# Patient Record
Sex: FEMALE | Race: WHITE | NOT HISPANIC OR LATINO | URBAN - METROPOLITAN AREA
[De-identification: names, ages, dates, MRNs, and addresses within clinical notes are randomized per-mention and may not be internally consistent; named-entity substitution may affect disease eponyms.]

---

## 2017-01-01 ENCOUNTER — ANESTHESIA EVENT (OUTPATIENT)
Dept: INTENSIVE CARE | Facility: HOSPITAL | Age: 77
DRG: 283 | End: 2017-01-01
Payer: MEDICARE

## 2017-01-01 ENCOUNTER — HOSPITAL ENCOUNTER (INPATIENT)
Facility: HOSPITAL | Age: 77
LOS: 6 days | DRG: 283 | End: 2017-04-09
Attending: INTERNAL MEDICINE | Admitting: INTERNAL MEDICINE
Payer: MEDICARE

## 2017-01-01 ENCOUNTER — ANESTHESIA (OUTPATIENT)
Dept: INTENSIVE CARE | Facility: HOSPITAL | Age: 77
DRG: 283 | End: 2017-01-01
Payer: MEDICARE

## 2017-01-01 ENCOUNTER — DOCUMENTATION ONLY (OUTPATIENT)
Dept: CARDIOLOGY | Facility: CLINIC | Age: 77
End: 2017-01-01

## 2017-01-01 VITALS
WEIGHT: 121.25 LBS | TEMPERATURE: 98 F | RESPIRATION RATE: 26 BRPM | OXYGEN SATURATION: 100 % | HEART RATE: 72 BPM | BODY MASS INDEX: 22.31 KG/M2 | DIASTOLIC BLOOD PRESSURE: 65 MMHG | SYSTOLIC BLOOD PRESSURE: 138 MMHG | HEIGHT: 62 IN

## 2017-01-01 DIAGNOSIS — I47.20 VT (VENTRICULAR TACHYCARDIA): ICD-10-CM

## 2017-01-01 DIAGNOSIS — N17.0 ATN (ACUTE TUBULAR NECROSIS): ICD-10-CM

## 2017-01-01 DIAGNOSIS — I47.20 VENTRICULAR TACHYCARDIA: ICD-10-CM

## 2017-01-01 DIAGNOSIS — T50.8X5A CONTRAST DYE INDUCED NEPHROPATHY: ICD-10-CM

## 2017-01-01 DIAGNOSIS — N17.9 AKI (ACUTE KIDNEY INJURY): Primary | ICD-10-CM

## 2017-01-01 DIAGNOSIS — I44.7 LBBB (LEFT BUNDLE BRANCH BLOCK): ICD-10-CM

## 2017-01-01 DIAGNOSIS — E87.1 HYPONATREMIA: ICD-10-CM

## 2017-01-01 DIAGNOSIS — I24.0 LV (LEFT VENTRICULAR) MURAL THROMBUS WITHOUT MI: ICD-10-CM

## 2017-01-01 DIAGNOSIS — N14.11 CONTRAST DYE INDUCED NEPHROPATHY: ICD-10-CM

## 2017-01-01 DIAGNOSIS — I50.22 CHRONIC SYSTOLIC HEART FAILURE: ICD-10-CM

## 2017-01-01 DIAGNOSIS — Z95.810 S/P ICD (INTERNAL CARDIAC DEFIBRILLATOR) PROCEDURE: ICD-10-CM

## 2017-01-01 DIAGNOSIS — N18.30 BENIGN HYPERTENSIVE HEART AND CKD, STAGE 3 (GFR 30-59), W CHF: ICD-10-CM

## 2017-01-01 DIAGNOSIS — R00.0 WIDE-COMPLEX TACHYCARDIA: ICD-10-CM

## 2017-01-01 DIAGNOSIS — Z95.1 S/P CABG (CORONARY ARTERY BYPASS GRAFT): ICD-10-CM

## 2017-01-01 DIAGNOSIS — I25.5 ISCHEMIC CARDIOMYOPATHY: ICD-10-CM

## 2017-01-01 DIAGNOSIS — Z95.810 CARDIAC RESYNCHRONIZATION THERAPY DEFIBRILLATOR (CRT-D) IN PLACE: ICD-10-CM

## 2017-01-01 DIAGNOSIS — I13.0 BENIGN HYPERTENSIVE HEART AND CKD, STAGE 3 (GFR 30-59), W CHF: ICD-10-CM

## 2017-01-01 LAB
ALBUMIN SERPL BCP-MCNC: 2.5 G/DL
ALBUMIN SERPL BCP-MCNC: 2.7 G/DL
ALBUMIN SERPL BCP-MCNC: 2.8 G/DL
ALP SERPL-CCNC: 101 U/L
ALP SERPL-CCNC: 106 U/L
ALP SERPL-CCNC: 82 U/L
ALP SERPL-CCNC: 82 U/L
ALP SERPL-CCNC: 95 U/L
ALP SERPL-CCNC: 98 U/L
ALT SERPL W/O P-5'-P-CCNC: 265 U/L
ALT SERPL W/O P-5'-P-CCNC: 292 U/L
ALT SERPL W/O P-5'-P-CCNC: 339 U/L
ALT SERPL W/O P-5'-P-CCNC: 359 U/L
ALT SERPL W/O P-5'-P-CCNC: 400 U/L
ALT SERPL W/O P-5'-P-CCNC: 419 U/L
ANION GAP SERPL CALC-SCNC: 14 MMOL/L
ANION GAP SERPL CALC-SCNC: 15 MMOL/L
ANION GAP SERPL CALC-SCNC: 15 MMOL/L
ANION GAP SERPL CALC-SCNC: 17 MMOL/L
ANION GAP SERPL CALC-SCNC: 17 MMOL/L
ANION GAP SERPL CALC-SCNC: 18 MMOL/L
ANION GAP SERPL CALC-SCNC: 18 MMOL/L
ANION GAP SERPL CALC-SCNC: 19 MMOL/L
ANION GAP SERPL CALC-SCNC: 20 MMOL/L
ANISOCYTOSIS BLD QL SMEAR: ABNORMAL
ANISOCYTOSIS BLD QL SMEAR: SLIGHT
AORTIC VALVE REGURGITATION: ABNORMAL
APTT BLDCRRT: 25 SEC
APTT BLDCRRT: 26.6 SEC
APTT BLDCRRT: >150 SEC
AST SERPL-CCNC: 129 U/L
AST SERPL-CCNC: 149 U/L
AST SERPL-CCNC: 161 U/L
AST SERPL-CCNC: 350 U/L
AST SERPL-CCNC: 77 U/L
AST SERPL-CCNC: 85 U/L
BACTERIA #/AREA URNS AUTO: NORMAL /HPF
BACTERIA #/AREA URNS AUTO: NORMAL /HPF
BACTERIA BLD CULT: NORMAL
BACTERIA BLD CULT: NORMAL
BACTERIA UR CULT: NO GROWTH
BACTERIA UR CULT: NORMAL
BASOPHILS # BLD AUTO: 0.01 K/UL
BASOPHILS # BLD AUTO: 0.01 K/UL
BASOPHILS # BLD AUTO: 0.02 K/UL
BASOPHILS # BLD AUTO: 0.02 K/UL
BASOPHILS # BLD AUTO: ABNORMAL K/UL
BASOPHILS NFR BLD: 0 %
BASOPHILS NFR BLD: 0 %
BASOPHILS NFR BLD: 0.1 %
BASOPHILS NFR BLD: 1 %
BILIRUB SERPL-MCNC: 0.5 MG/DL
BILIRUB SERPL-MCNC: 0.5 MG/DL
BILIRUB SERPL-MCNC: 0.6 MG/DL
BILIRUB UR QL STRIP: NEGATIVE
BILIRUB UR QL STRIP: NEGATIVE
BNP SERPL-MCNC: 2705 PG/ML
BUN SERPL-MCNC: 105 MG/DL
BUN SERPL-MCNC: 112 MG/DL
BUN SERPL-MCNC: 113 MG/DL
BUN SERPL-MCNC: 115 MG/DL
BUN SERPL-MCNC: 119 MG/DL
BUN SERPL-MCNC: 72 MG/DL
BUN SERPL-MCNC: 89 MG/DL
BURR CELLS BLD QL SMEAR: ABNORMAL
C DIFF GDH STL QL: NEGATIVE
C DIFF TOX A+B STL QL IA: NEGATIVE
CALCIUM SERPL-MCNC: 7.5 MG/DL
CALCIUM SERPL-MCNC: 7.8 MG/DL
CALCIUM SERPL-MCNC: 7.9 MG/DL
CALCIUM SERPL-MCNC: 8.1 MG/DL
CALCIUM SERPL-MCNC: 8.4 MG/DL
CALCIUM SERPL-MCNC: 8.5 MG/DL
CALCIUM SERPL-MCNC: 8.6 MG/DL
CALCIUM SERPL-MCNC: 8.7 MG/DL
CALCIUM SERPL-MCNC: 8.9 MG/DL
CHLORIDE SERPL-SCNC: 90 MMOL/L
CHLORIDE SERPL-SCNC: 90 MMOL/L
CHLORIDE SERPL-SCNC: 91 MMOL/L
CHLORIDE SERPL-SCNC: 93 MMOL/L
CHLORIDE SERPL-SCNC: 95 MMOL/L
CHLORIDE SERPL-SCNC: 96 MMOL/L
CHLORIDE SERPL-SCNC: 96 MMOL/L
CHLORIDE SERPL-SCNC: 97 MMOL/L
CHLORIDE SERPL-SCNC: 98 MMOL/L
CHOLEST/HDLC SERPL: 2.4 {RATIO}
CLARITY UR REFRACT.AUTO: ABNORMAL
CLARITY UR REFRACT.AUTO: CLEAR
CO2 SERPL-SCNC: 13 MMOL/L
CO2 SERPL-SCNC: 15 MMOL/L
CO2 SERPL-SCNC: 15 MMOL/L
CO2 SERPL-SCNC: 16 MMOL/L
CO2 SERPL-SCNC: 16 MMOL/L
CO2 SERPL-SCNC: 17 MMOL/L
CO2 SERPL-SCNC: 21 MMOL/L
CO2 SERPL-SCNC: 23 MMOL/L
CO2 SERPL-SCNC: 23 MMOL/L
COLOR UR AUTO: YELLOW
COLOR UR AUTO: YELLOW
CREAT SERPL-MCNC: 2.8 MG/DL
CREAT SERPL-MCNC: 3 MG/DL
CREAT SERPL-MCNC: 3.1 MG/DL
CREAT SERPL-MCNC: 3.2 MG/DL
CREAT SERPL-MCNC: 3.5 MG/DL
CREAT SERPL-MCNC: 3.7 MG/DL
CREAT SERPL-MCNC: 4 MG/DL
CREAT SERPL-MCNC: 4 MG/DL
CREAT SERPL-MCNC: 4.1 MG/DL
CREAT UR-MCNC: 47 MG/DL
DACRYOCYTES BLD QL SMEAR: ABNORMAL
DIASTOLIC DYSFUNCTION: YES
DIFFERENTIAL METHOD: ABNORMAL
EOSINOPHIL # BLD AUTO: 0 K/UL
EOSINOPHIL # BLD AUTO: ABNORMAL K/UL
EOSINOPHIL NFR BLD: 0 %
EOSINOPHIL NFR BLD: 2 %
ERYTHROCYTE [DISTWIDTH] IN BLOOD BY AUTOMATED COUNT: 15.2 %
ERYTHROCYTE [DISTWIDTH] IN BLOOD BY AUTOMATED COUNT: 15.8 %
ERYTHROCYTE [DISTWIDTH] IN BLOOD BY AUTOMATED COUNT: 15.9 %
ERYTHROCYTE [DISTWIDTH] IN BLOOD BY AUTOMATED COUNT: 16.4 %
ERYTHROCYTE [DISTWIDTH] IN BLOOD BY AUTOMATED COUNT: 17 %
ERYTHROCYTE [DISTWIDTH] IN BLOOD BY AUTOMATED COUNT: 17.4 %
EST. GFR  (AFRICAN AMERICAN): 11.5 ML/MIN/1.73 M^2
EST. GFR  (AFRICAN AMERICAN): 11.8 ML/MIN/1.73 M^2
EST. GFR  (AFRICAN AMERICAN): 11.8 ML/MIN/1.73 M^2
EST. GFR  (AFRICAN AMERICAN): 13 ML/MIN/1.73 M^2
EST. GFR  (AFRICAN AMERICAN): 13.9 ML/MIN/1.73 M^2
EST. GFR  (AFRICAN AMERICAN): 15.5 ML/MIN/1.73 M^2
EST. GFR  (AFRICAN AMERICAN): 16.1 ML/MIN/1.73 M^2
EST. GFR  (AFRICAN AMERICAN): 16.8 ML/MIN/1.73 M^2
EST. GFR  (AFRICAN AMERICAN): 18.2 ML/MIN/1.73 M^2
EST. GFR  (NON AFRICAN AMERICAN): 10 ML/MIN/1.73 M^2
EST. GFR  (NON AFRICAN AMERICAN): 10.3 ML/MIN/1.73 M^2
EST. GFR  (NON AFRICAN AMERICAN): 10.3 ML/MIN/1.73 M^2
EST. GFR  (NON AFRICAN AMERICAN): 11.3 ML/MIN/1.73 M^2
EST. GFR  (NON AFRICAN AMERICAN): 12.1 ML/MIN/1.73 M^2
EST. GFR  (NON AFRICAN AMERICAN): 13.4 ML/MIN/1.73 M^2
EST. GFR  (NON AFRICAN AMERICAN): 14 ML/MIN/1.73 M^2
EST. GFR  (NON AFRICAN AMERICAN): 14.5 ML/MIN/1.73 M^2
EST. GFR  (NON AFRICAN AMERICAN): 15.8 ML/MIN/1.73 M^2
ESTIMATED PA SYSTOLIC PRESSURE: 40
FACT X PPP CHRO-ACNC: 1.28 IU/ML
FACT X PPP CHRO-ACNC: <0.1 IU/ML
GLUCOSE SERPL-MCNC: 103 MG/DL
GLUCOSE SERPL-MCNC: 103 MG/DL
GLUCOSE SERPL-MCNC: 111 MG/DL
GLUCOSE SERPL-MCNC: 111 MG/DL
GLUCOSE SERPL-MCNC: 114 MG/DL
GLUCOSE SERPL-MCNC: 117 MG/DL
GLUCOSE SERPL-MCNC: 126 MG/DL
GLUCOSE SERPL-MCNC: 132 MG/DL
GLUCOSE SERPL-MCNC: 180 MG/DL
GLUCOSE UR QL STRIP: NEGATIVE
GLUCOSE UR QL STRIP: NEGATIVE
HAV IGM SERPL QL IA: NEGATIVE
HBV CORE IGM SERPL QL IA: NEGATIVE
HBV SURFACE AG SERPL QL IA: NEGATIVE
HCT VFR BLD AUTO: 31.8 %
HCT VFR BLD AUTO: 32.3 %
HCT VFR BLD AUTO: 33.7 %
HCT VFR BLD AUTO: 35.9 %
HCT VFR BLD AUTO: 36.9 %
HCT VFR BLD AUTO: 37.5 %
HCV AB SERPL QL IA: NEGATIVE
HDL/CHOLESTEROL RATIO: 42.2 %
HDLC SERPL-MCNC: 35 MG/DL
HDLC SERPL-MCNC: 83 MG/DL
HGB BLD-MCNC: 11 G/DL
HGB BLD-MCNC: 11.3 G/DL
HGB BLD-MCNC: 11.5 G/DL
HGB BLD-MCNC: 12.6 G/DL
HGB BLD-MCNC: 13 G/DL
HGB BLD-MCNC: 13.1 G/DL
HGB UR QL STRIP: ABNORMAL
HGB UR QL STRIP: NEGATIVE
HYALINE CASTS UR QL AUTO: 1 /LPF
HYALINE CASTS UR QL AUTO: 1 /LPF
HYPOCHROMIA BLD QL SMEAR: ABNORMAL
HYPOCHROMIA BLD QL SMEAR: ABNORMAL
INR PPP: 2
INR PPP: 2.1
INR PPP: 4.3
INR PPP: 4.6
INR PPP: 4.6
INR PPP: 5.5
KETONES UR QL STRIP: NEGATIVE
KETONES UR QL STRIP: NEGATIVE
LACTATE SERPL-SCNC: 1.6 MMOL/L
LDLC SERPL CALC-MCNC: 40.2 MG/DL
LEUKOCYTE ESTERASE UR QL STRIP: ABNORMAL
LEUKOCYTE ESTERASE UR QL STRIP: NEGATIVE
LIDOCAIN SERPL-MCNC: 8 MCG/ML
LYMPHOCYTES # BLD AUTO: 1.1 K/UL
LYMPHOCYTES # BLD AUTO: 1.2 K/UL
LYMPHOCYTES # BLD AUTO: 1.3 K/UL
LYMPHOCYTES # BLD AUTO: 1.6 K/UL
LYMPHOCYTES # BLD AUTO: ABNORMAL K/UL
LYMPHOCYTES NFR BLD: 4.2 %
LYMPHOCYTES NFR BLD: 4.9 %
LYMPHOCYTES NFR BLD: 6 %
LYMPHOCYTES NFR BLD: 6.2 %
LYMPHOCYTES NFR BLD: 7 %
LYMPHOCYTES NFR BLD: 8.7 %
MAGNESIUM SERPL-MCNC: 1.9 MG/DL
MAGNESIUM SERPL-MCNC: 1.9 MG/DL
MAGNESIUM SERPL-MCNC: 2 MG/DL
MAGNESIUM SERPL-MCNC: 2.1 MG/DL
MCH RBC QN AUTO: 28.5 PG
MCH RBC QN AUTO: 28.6 PG
MCH RBC QN AUTO: 29.2 PG
MCH RBC QN AUTO: 29.4 PG
MCH RBC QN AUTO: 29.4 PG
MCH RBC QN AUTO: 29.5 PG
MCHC RBC AUTO-ENTMCNC: 33.5 %
MCHC RBC AUTO-ENTMCNC: 33.6 %
MCHC RBC AUTO-ENTMCNC: 34.6 %
MCHC RBC AUTO-ENTMCNC: 35.2 %
MCHC RBC AUTO-ENTMCNC: 35.6 %
MCHC RBC AUTO-ENTMCNC: 36.5 %
MCV RBC AUTO: 81 FL
MCV RBC AUTO: 82 FL
MCV RBC AUTO: 84 FL
MCV RBC AUTO: 85 FL
MICROSCOPIC COMMENT: NORMAL
MICROSCOPIC COMMENT: NORMAL
MITRAL VALVE REGURGITATION: ABNORMAL
MONOCYTES # BLD AUTO: 0.9 K/UL
MONOCYTES # BLD AUTO: 1.2 K/UL
MONOCYTES # BLD AUTO: 1.3 K/UL
MONOCYTES # BLD AUTO: 1.6 K/UL
MONOCYTES # BLD AUTO: ABNORMAL K/UL
MONOCYTES NFR BLD: 5 %
MONOCYTES NFR BLD: 6 %
MONOCYTES NFR BLD: 6.6 %
MONOCYTES NFR BLD: 8 %
NEUTROPHILS # BLD AUTO: 15.6 K/UL
NEUTROPHILS # BLD AUTO: 18.3 K/UL
NEUTROPHILS # BLD AUTO: 21.4 K/UL
NEUTROPHILS # BLD AUTO: 22 K/UL
NEUTROPHILS NFR BLD: 82 %
NEUTROPHILS NFR BLD: 85 %
NEUTROPHILS NFR BLD: 85.5 %
NEUTROPHILS NFR BLD: 87.8 %
NEUTROPHILS NFR BLD: 89.1 %
NEUTROPHILS NFR BLD: 90 %
NEUTS BAND NFR BLD MANUAL: 4 %
NITRITE UR QL STRIP: NEGATIVE
NITRITE UR QL STRIP: NEGATIVE
NONHDLC SERPL-MCNC: 48 MG/DL
OSMOLALITY SERPL: 285 MOSM/KG
OSMOLALITY UR: 313 MOSM/KG
OVALOCYTES BLD QL SMEAR: ABNORMAL
OVALOCYTES BLD QL SMEAR: ABNORMAL
PH UR STRIP: 5 [PH] (ref 5–8)
PH UR STRIP: 5 [PH] (ref 5–8)
PHOSPHATE SERPL-MCNC: 4.2 MG/DL
PHOSPHATE SERPL-MCNC: 4.8 MG/DL
PHOSPHATE SERPL-MCNC: 5.5 MG/DL
PHOSPHATE SERPL-MCNC: 6.6 MG/DL
PHOSPHATE SERPL-MCNC: 8.1 MG/DL
PHOSPHATE SERPL-MCNC: 8.4 MG/DL
PLATELET # BLD AUTO: 214 K/UL
PLATELET # BLD AUTO: 222 K/UL
PLATELET # BLD AUTO: 226 K/UL
PLATELET # BLD AUTO: 229 K/UL
PLATELET # BLD AUTO: 238 K/UL
PLATELET # BLD AUTO: 256 K/UL
PLATELET BLD QL SMEAR: ABNORMAL
PMV BLD AUTO: 10 FL
PMV BLD AUTO: 10.1 FL
PMV BLD AUTO: 10.3 FL
PMV BLD AUTO: 10.4 FL
PMV BLD AUTO: 10.5 FL
PMV BLD AUTO: 10.9 FL
POCT GLUCOSE: 128 MG/DL (ref 70–110)
POCT GLUCOSE: 150 MG/DL (ref 70–110)
POIKILOCYTOSIS BLD QL SMEAR: ABNORMAL
POIKILOCYTOSIS BLD QL SMEAR: SLIGHT
POIKILOCYTOSIS BLD QL SMEAR: SLIGHT
POLYCHROMASIA BLD QL SMEAR: ABNORMAL
POTASSIUM SERPL-SCNC: 3.7 MMOL/L
POTASSIUM SERPL-SCNC: 3.7 MMOL/L
POTASSIUM SERPL-SCNC: 3.8 MMOL/L
POTASSIUM SERPL-SCNC: 3.9 MMOL/L
POTASSIUM SERPL-SCNC: 4 MMOL/L
POTASSIUM SERPL-SCNC: 4 MMOL/L
POTASSIUM SERPL-SCNC: 4.3 MMOL/L
POTASSIUM SERPL-SCNC: 4.4 MMOL/L
POTASSIUM SERPL-SCNC: 5.1 MMOL/L
PROT SERPL-MCNC: 5.9 G/DL
PROT SERPL-MCNC: 6 G/DL
PROT SERPL-MCNC: 6 G/DL
PROT SERPL-MCNC: 6.2 G/DL
PROT SERPL-MCNC: 6.2 G/DL
PROT SERPL-MCNC: 6.5 G/DL
PROT UR QL STRIP: NEGATIVE
PROT UR QL STRIP: NEGATIVE
PROTHROMBIN TIME: 20.2 SEC
PROTHROMBIN TIME: 20.8 SEC
PROTHROMBIN TIME: 43.3 SEC
PROTHROMBIN TIME: 46.5 SEC
PROTHROMBIN TIME: 47.2 SEC
PROTHROMBIN TIME: 56.4 SEC
RBC # BLD AUTO: 3.74 M/UL
RBC # BLD AUTO: 3.94 M/UL
RBC # BLD AUTO: 3.96 M/UL
RBC # BLD AUTO: 4.4 M/UL
RBC # BLD AUTO: 4.41 M/UL
RBC # BLD AUTO: 4.45 M/UL
RBC #/AREA URNS AUTO: 1 /HPF (ref 0–4)
RBC #/AREA URNS AUTO: 1 /HPF (ref 0–4)
RETIRED EF AND QEF - SEE NOTES: 20 (ref 55–65)
SODIUM SERPL-SCNC: 122 MMOL/L
SODIUM SERPL-SCNC: 123 MMOL/L
SODIUM SERPL-SCNC: 125 MMOL/L
SODIUM SERPL-SCNC: 125 MMOL/L
SODIUM SERPL-SCNC: 130 MMOL/L
SODIUM SERPL-SCNC: 132 MMOL/L
SODIUM SERPL-SCNC: 132 MMOL/L
SODIUM SERPL-SCNC: 134 MMOL/L
SODIUM SERPL-SCNC: 135 MMOL/L
SODIUM UR-SCNC: 32 MMOL/L
SP GR UR STRIP: 1.01 (ref 1–1.03)
SP GR UR STRIP: 1.01 (ref 1–1.03)
SQUAMOUS #/AREA URNS AUTO: 0 /HPF
SQUAMOUS #/AREA URNS AUTO: 1 /HPF
T4 SERPL-MCNC: 6.4 UG/DL
TARGETS BLD QL SMEAR: ABNORMAL
TRICUSPID VALVE REGURGITATION: ABNORMAL
TRIGL SERPL-MCNC: 39 MG/DL
TSH SERPL DL<=0.005 MIU/L-ACNC: 0.96 UIU/ML
URN SPEC COLLECT METH UR: ABNORMAL
URN SPEC COLLECT METH UR: ABNORMAL
UROBILINOGEN UR STRIP-ACNC: NEGATIVE EU/DL
UROBILINOGEN UR STRIP-ACNC: NEGATIVE EU/DL
UUN UR-MCNC: 338 MG/DL
WBC # BLD AUTO: 18.27 K/UL
WBC # BLD AUTO: 20.96 K/UL
WBC # BLD AUTO: 21.06 K/UL
WBC # BLD AUTO: 21.47 K/UL
WBC # BLD AUTO: 24.05 K/UL
WBC # BLD AUTO: 24.8 K/UL
WBC #/AREA URNS AUTO: 2 /HPF (ref 0–5)
WBC #/AREA URNS AUTO: 2 /HPF (ref 0–5)

## 2017-01-01 PROCEDURE — 25000003 PHARM REV CODE 250: Performed by: STUDENT IN AN ORGANIZED HEALTH CARE EDUCATION/TRAINING PROGRAM

## 2017-01-01 PROCEDURE — 63600175 PHARM REV CODE 636 W HCPCS: Performed by: INTERNAL MEDICINE

## 2017-01-01 PROCEDURE — 93005 ELECTROCARDIOGRAM TRACING: CPT

## 2017-01-01 PROCEDURE — 83735 ASSAY OF MAGNESIUM: CPT

## 2017-01-01 PROCEDURE — 25000003 PHARM REV CODE 250

## 2017-01-01 PROCEDURE — 84132 ASSAY OF SERUM POTASSIUM: CPT

## 2017-01-01 PROCEDURE — 20000000 HC ICU ROOM

## 2017-01-01 PROCEDURE — 25000003 PHARM REV CODE 250: Performed by: INTERNAL MEDICINE

## 2017-01-01 PROCEDURE — 81001 URINALYSIS AUTO W/SCOPE: CPT

## 2017-01-01 PROCEDURE — 85025 COMPLETE CBC W/AUTO DIFF WBC: CPT

## 2017-01-01 PROCEDURE — 36415 COLL VENOUS BLD VENIPUNCTURE: CPT

## 2017-01-01 PROCEDURE — 85610 PROTHROMBIN TIME: CPT

## 2017-01-01 PROCEDURE — 27200966 HC CLOSED SUCTION SYSTEM

## 2017-01-01 PROCEDURE — 99223 1ST HOSP IP/OBS HIGH 75: CPT | Mod: ,,, | Performed by: INTERNAL MEDICINE

## 2017-01-01 PROCEDURE — 99233 SBSQ HOSP IP/OBS HIGH 50: CPT | Mod: ,,, | Performed by: INTERNAL MEDICINE

## 2017-01-01 PROCEDURE — 80053 COMPREHEN METABOLIC PANEL: CPT

## 2017-01-01 PROCEDURE — 82330 ASSAY OF CALCIUM: CPT

## 2017-01-01 PROCEDURE — 85014 HEMATOCRIT: CPT

## 2017-01-01 PROCEDURE — 80048 BASIC METABOLIC PNL TOTAL CA: CPT

## 2017-01-01 PROCEDURE — 85027 COMPLETE CBC AUTOMATED: CPT

## 2017-01-01 PROCEDURE — 87449 NOS EACH ORGANISM AG IA: CPT

## 2017-01-01 PROCEDURE — 87086 URINE CULTURE/COLONY COUNT: CPT

## 2017-01-01 PROCEDURE — 97161 PT EVAL LOW COMPLEX 20 MIN: CPT

## 2017-01-01 PROCEDURE — 84300 ASSAY OF URINE SODIUM: CPT

## 2017-01-01 PROCEDURE — 27000221 HC OXYGEN, UP TO 24 HOURS

## 2017-01-01 PROCEDURE — 97530 THERAPEUTIC ACTIVITIES: CPT

## 2017-01-01 PROCEDURE — 84100 ASSAY OF PHOSPHORUS: CPT

## 2017-01-01 PROCEDURE — 0BH17EZ INSERTION OF ENDOTRACHEAL AIRWAY INTO TRACHEA, VIA NATURAL OR ARTIFICIAL OPENING: ICD-10-PCS | Performed by: INTERNAL MEDICINE

## 2017-01-01 PROCEDURE — 93306 TTE W/DOPPLER COMPLETE: CPT | Mod: 26,,, | Performed by: INTERNAL MEDICINE

## 2017-01-01 PROCEDURE — 5A1935Z RESPIRATORY VENTILATION, LESS THAN 24 CONSECUTIVE HOURS: ICD-10-PCS | Performed by: INTERNAL MEDICINE

## 2017-01-01 PROCEDURE — 82803 BLOOD GASES ANY COMBINATION: CPT

## 2017-01-01 PROCEDURE — 93041 RHYTHM ECG TRACING: CPT

## 2017-01-01 PROCEDURE — 63600175 PHARM REV CODE 636 W HCPCS

## 2017-01-01 PROCEDURE — 84436 ASSAY OF TOTAL THYROXINE: CPT

## 2017-01-01 PROCEDURE — 94002 VENT MGMT INPAT INIT DAY: CPT

## 2017-01-01 PROCEDURE — 93010 ELECTROCARDIOGRAM REPORT: CPT | Mod: ,,, | Performed by: INTERNAL MEDICINE

## 2017-01-01 PROCEDURE — 63600175 PHARM REV CODE 636 W HCPCS: Performed by: STUDENT IN AN ORGANIZED HEALTH CARE EDUCATION/TRAINING PROGRAM

## 2017-01-01 PROCEDURE — 87088 URINE BACTERIA CULTURE: CPT

## 2017-01-01 PROCEDURE — 02HV33Z INSERTION OF INFUSION DEVICE INTO SUPERIOR VENA CAVA, PERCUTANEOUS APPROACH: ICD-10-PCS | Performed by: INTERNAL MEDICINE

## 2017-01-01 PROCEDURE — 99900026 HC AIRWAY MAINTENANCE (STAT)

## 2017-01-01 PROCEDURE — 83930 ASSAY OF BLOOD OSMOLALITY: CPT

## 2017-01-01 PROCEDURE — 83880 ASSAY OF NATRIURETIC PEPTIDE: CPT

## 2017-01-01 PROCEDURE — 83605 ASSAY OF LACTIC ACID: CPT

## 2017-01-01 PROCEDURE — 97802 MEDICAL NUTRITION INDIV IN: CPT

## 2017-01-01 PROCEDURE — 94761 N-INVAS EAR/PLS OXIMETRY MLT: CPT

## 2017-01-01 PROCEDURE — 85520 HEPARIN ASSAY: CPT

## 2017-01-01 PROCEDURE — 51702 INSERT TEMP BLADDER CATH: CPT

## 2017-01-01 PROCEDURE — 97165 OT EVAL LOW COMPLEX 30 MIN: CPT

## 2017-01-01 PROCEDURE — 83935 ASSAY OF URINE OSMOLALITY: CPT

## 2017-01-01 PROCEDURE — 93010 ELECTROCARDIOGRAM REPORT: CPT | Mod: 76,,, | Performed by: INTERNAL MEDICINE

## 2017-01-01 PROCEDURE — 85007 BL SMEAR W/DIFF WBC COUNT: CPT

## 2017-01-01 PROCEDURE — 80074 ACUTE HEPATITIS PANEL: CPT

## 2017-01-01 PROCEDURE — 84443 ASSAY THYROID STIM HORMONE: CPT

## 2017-01-01 PROCEDURE — C8929 TTE W OR WO FOL WCON,DOPPLER: HCPCS

## 2017-01-01 PROCEDURE — 80176 ASSAY OF LIDOCAINE: CPT

## 2017-01-01 PROCEDURE — 85730 THROMBOPLASTIN TIME PARTIAL: CPT

## 2017-01-01 PROCEDURE — 85730 THROMBOPLASTIN TIME PARTIAL: CPT | Mod: 91

## 2017-01-01 PROCEDURE — 97116 GAIT TRAINING THERAPY: CPT

## 2017-01-01 PROCEDURE — 99232 SBSQ HOSP IP/OBS MODERATE 35: CPT | Mod: ,,, | Performed by: INTERNAL MEDICINE

## 2017-01-01 PROCEDURE — 87040 BLOOD CULTURE FOR BACTERIA: CPT | Mod: 59

## 2017-01-01 PROCEDURE — 84295 ASSAY OF SERUM SODIUM: CPT

## 2017-01-01 PROCEDURE — 99900035 HC TECH TIME PER 15 MIN (STAT)

## 2017-01-01 PROCEDURE — 85520 HEPARIN ASSAY: CPT | Mod: 91

## 2017-01-01 PROCEDURE — 97110 THERAPEUTIC EXERCISES: CPT

## 2017-01-01 PROCEDURE — 80061 LIPID PANEL: CPT

## 2017-01-01 PROCEDURE — 82570 ASSAY OF URINE CREATININE: CPT

## 2017-01-01 PROCEDURE — 84540 ASSAY OF URINE/UREA-N: CPT

## 2017-01-01 RX ORDER — POTASSIUM CHLORIDE 20 MEQ/1
40 TABLET, EXTENDED RELEASE ORAL ONCE
Status: COMPLETED | OUTPATIENT
Start: 2017-01-01 | End: 2017-01-01

## 2017-01-01 RX ORDER — CEFAZOLIN SODIUM 2 G/50ML
2 SOLUTION INTRAVENOUS
Status: DISCONTINUED | OUTPATIENT
Start: 2017-01-01 | End: 2017-01-01 | Stop reason: HOSPADM

## 2017-01-01 RX ORDER — ACETAMINOPHEN 325 MG/1
325 TABLET ORAL EVERY 6 HOURS PRN
Status: DISCONTINUED | OUTPATIENT
Start: 2017-01-01 | End: 2017-01-01 | Stop reason: HOSPADM

## 2017-01-01 RX ORDER — ALPRAZOLAM 0.5 MG/1
0.5 TABLET ORAL NIGHTLY PRN
Status: DISCONTINUED | OUTPATIENT
Start: 2017-01-01 | End: 2017-01-01

## 2017-01-01 RX ORDER — LIDOCAINE HYDROCHLORIDE ANHYDROUS AND DEXTROSE MONOHYDRATE .8; 5 G/100ML; G/100ML
1 INJECTION, SOLUTION INTRAVENOUS CONTINUOUS
Status: DISCONTINUED | OUTPATIENT
Start: 2017-01-01 | End: 2017-01-01

## 2017-01-01 RX ORDER — CHLORHEXIDINE GLUCONATE ORAL RINSE 1.2 MG/ML
15 SOLUTION DENTAL 2 TIMES DAILY
Status: DISCONTINUED | OUTPATIENT
Start: 2017-01-01 | End: 2017-01-01 | Stop reason: HOSPADM

## 2017-01-01 RX ORDER — VASOPRESSIN 20 [USP'U]/ML
INJECTION, SOLUTION INTRAMUSCULAR; SUBCUTANEOUS
Status: COMPLETED
Start: 2017-01-01 | End: 2017-01-01

## 2017-01-01 RX ORDER — ATROPINE SULFATE 1 MG/ML
INJECTION, SOLUTION INTRAMUSCULAR; INTRAVENOUS; SUBCUTANEOUS CODE/TRAUMA/SEDATION MEDICATION
Status: COMPLETED | OUTPATIENT
Start: 2017-01-01 | End: 2017-01-01

## 2017-01-01 RX ORDER — POTASSIUM CHLORIDE 20 MEQ/1
20 TABLET, EXTENDED RELEASE ORAL ONCE
Status: COMPLETED | OUTPATIENT
Start: 2017-01-01 | End: 2017-01-01

## 2017-01-01 RX ORDER — AMIODARONE HYDROCHLORIDE 200 MG/1
400 TABLET ORAL 2 TIMES DAILY
Status: DISCONTINUED | OUTPATIENT
Start: 2017-01-01 | End: 2017-01-01 | Stop reason: HOSPADM

## 2017-01-01 RX ORDER — MIDAZOLAM HYDROCHLORIDE 1 MG/ML
INJECTION INTRAMUSCULAR; INTRAVENOUS
Status: COMPLETED
Start: 2017-01-01 | End: 2017-01-01

## 2017-01-01 RX ORDER — NOREPINEPHRINE BITARTRATE/D5W 4MG/250ML
0.05 PLASTIC BAG, INJECTION (ML) INTRAVENOUS CONTINUOUS
Status: DISCONTINUED | OUTPATIENT
Start: 2017-01-01 | End: 2017-01-01 | Stop reason: HOSPADM

## 2017-01-01 RX ORDER — NOREPINEPHRINE BITARTRATE 1 MG/ML
INJECTION, SOLUTION INTRAVENOUS
Status: COMPLETED
Start: 2017-01-01 | End: 2017-01-01

## 2017-01-01 RX ORDER — ROCURONIUM BROMIDE 10 MG/ML
INJECTION, SOLUTION INTRAVENOUS
Status: COMPLETED
Start: 2017-01-01 | End: 2017-01-01

## 2017-01-01 RX ORDER — PANTOPRAZOLE SODIUM 40 MG/1
40 TABLET, DELAYED RELEASE ORAL DAILY
Status: DISCONTINUED | OUTPATIENT
Start: 2017-01-01 | End: 2017-01-01 | Stop reason: HOSPADM

## 2017-01-01 RX ORDER — FUROSEMIDE 40 MG/1
40 TABLET ORAL 2 TIMES DAILY
Status: DISCONTINUED | OUTPATIENT
Start: 2017-01-01 | End: 2017-01-01

## 2017-01-01 RX ORDER — SODIUM BICARBONATE 1 MEQ/ML
SYRINGE (ML) INTRAVENOUS CODE/TRAUMA/SEDATION MEDICATION
Status: COMPLETED | OUTPATIENT
Start: 2017-01-01 | End: 2017-01-01

## 2017-01-01 RX ORDER — EPINEPHRINE 1 MG/ML
INJECTION INTRAMUSCULAR; INTRAVENOUS; SUBCUTANEOUS CODE/TRAUMA/SEDATION MEDICATION
Status: COMPLETED | OUTPATIENT
Start: 2017-01-01 | End: 2017-01-01

## 2017-01-01 RX ORDER — MEXILETINE HYDROCHLORIDE 200 MG/1
200 CAPSULE ORAL EVERY 8 HOURS
Status: DISCONTINUED | OUTPATIENT
Start: 2017-01-01 | End: 2017-01-01 | Stop reason: HOSPADM

## 2017-01-01 RX ORDER — LORAZEPAM 2 MG/ML
0.5 INJECTION INTRAMUSCULAR
Status: DISCONTINUED | OUTPATIENT
Start: 2017-01-01 | End: 2017-01-01 | Stop reason: HOSPADM

## 2017-01-01 RX ORDER — WARFARIN SODIUM 5 MG/1
5 TABLET ORAL DAILY
Status: DISCONTINUED | OUTPATIENT
Start: 2017-01-01 | End: 2017-01-01

## 2017-01-01 RX ORDER — CALCIUM CHLORIDE INJECTION 100 MG/ML
INJECTION, SOLUTION INTRAVENOUS CODE/TRAUMA/SEDATION MEDICATION
Status: COMPLETED | OUTPATIENT
Start: 2017-01-01 | End: 2017-01-01

## 2017-01-01 RX ORDER — ROCURONIUM BROMIDE 10 MG/ML
INJECTION, SOLUTION INTRAVENOUS
Status: DISCONTINUED
Start: 2017-01-01 | End: 2017-01-01 | Stop reason: WASHOUT

## 2017-01-01 RX ORDER — ATROPINE SULFATE 10 MG/ML
1 SOLUTION/ DROPS OPHTHALMIC EVERY 6 HOURS PRN
Status: DISCONTINUED | OUTPATIENT
Start: 2017-01-01 | End: 2017-01-01 | Stop reason: HOSPADM

## 2017-01-01 RX ORDER — HEPARIN SODIUM,PORCINE/D5W 25000/250
17 INTRAVENOUS SOLUTION INTRAVENOUS CONTINUOUS
Status: DISCONTINUED | OUTPATIENT
Start: 2017-01-01 | End: 2017-01-01

## 2017-01-01 RX ORDER — SUCCINYLCHOLINE CHLORIDE 20 MG/ML
INJECTION INTRAMUSCULAR; INTRAVENOUS
Status: COMPLETED
Start: 2017-01-01 | End: 2017-01-01

## 2017-01-01 RX ORDER — NOREPINEPHRINE BITARTRATE/D5W 4MG/250ML
PLASTIC BAG, INJECTION (ML) INTRAVENOUS
Status: COMPLETED
Start: 2017-01-01 | End: 2017-01-01

## 2017-01-01 RX ORDER — FUROSEMIDE 10 MG/ML
80 INJECTION INTRAMUSCULAR; INTRAVENOUS ONCE
Status: COMPLETED | OUTPATIENT
Start: 2017-01-01 | End: 2017-01-01

## 2017-01-01 RX ORDER — ASPIRIN 81 MG/1
81 TABLET ORAL DAILY
Status: DISCONTINUED | OUTPATIENT
Start: 2017-01-01 | End: 2017-01-01 | Stop reason: HOSPADM

## 2017-01-01 RX ORDER — ETOMIDATE 2 MG/ML
INJECTION INTRAVENOUS
Status: COMPLETED
Start: 2017-01-01 | End: 2017-01-01

## 2017-01-01 RX ORDER — FUROSEMIDE 10 MG/ML
INJECTION INTRAMUSCULAR; INTRAVENOUS
Status: COMPLETED
Start: 2017-01-01 | End: 2017-01-01

## 2017-01-01 RX ORDER — SODIUM CHLORIDE 0.9 % (FLUSH) 0.9 %
3 SYRINGE (ML) INJECTION EVERY 8 HOURS
Status: DISCONTINUED | OUTPATIENT
Start: 2017-01-01 | End: 2017-01-01

## 2017-01-01 RX ORDER — INDOMETHACIN 25 MG/1
CAPSULE ORAL
Status: COMPLETED
Start: 2017-01-01 | End: 2017-01-01

## 2017-01-01 RX ORDER — FUROSEMIDE 10 MG/ML
80 INJECTION INTRAMUSCULAR; INTRAVENOUS 3 TIMES DAILY
Status: DISCONTINUED | OUTPATIENT
Start: 2017-01-01 | End: 2017-01-01

## 2017-01-01 RX ORDER — ALPRAZOLAM 0.25 MG/1
0.25 TABLET ORAL ONCE
Status: DISCONTINUED | OUTPATIENT
Start: 2017-01-01 | End: 2017-01-01

## 2017-01-01 RX ORDER — CEFEPIME HYDROCHLORIDE 1 G/50ML
1 INJECTION, SOLUTION INTRAVENOUS
Status: DISCONTINUED | OUTPATIENT
Start: 2017-01-01 | End: 2017-01-01 | Stop reason: HOSPADM

## 2017-01-01 RX ADMIN — CALCIUM CHLORIDE 1 G: 100 INJECTION, SOLUTION INTRAVENOUS at 03:04

## 2017-01-01 RX ADMIN — AMIODARONE HYDROCHLORIDE 400 MG: 200 TABLET ORAL at 09:04

## 2017-01-01 RX ADMIN — MEXILETINE HYDROCHLORIDE 200 MG: 200 CAPSULE ORAL at 02:04

## 2017-01-01 RX ADMIN — SODIUM CHLORIDE, PRESERVATIVE FREE 3 ML: 5 INJECTION INTRAVENOUS at 04:04

## 2017-01-01 RX ADMIN — FUROSEMIDE 80 MG: 10 INJECTION, SOLUTION INTRAMUSCULAR; INTRAVENOUS at 02:04

## 2017-01-01 RX ADMIN — MEXILETINE HYDROCHLORIDE 200 MG: 200 CAPSULE ORAL at 06:04

## 2017-01-01 RX ADMIN — POTASSIUM CHLORIDE 40 MEQ: 1500 TABLET, EXTENDED RELEASE ORAL at 08:04

## 2017-01-01 RX ADMIN — FUROSEMIDE 20 MG/HR: 10 INJECTION, SOLUTION INTRAMUSCULAR; INTRAVENOUS at 06:04

## 2017-01-01 RX ADMIN — ASPIRIN 81 MG: 81 TABLET, COATED ORAL at 08:04

## 2017-01-01 RX ADMIN — ASPIRIN 81 MG: 81 TABLET, COATED ORAL at 09:04

## 2017-01-01 RX ADMIN — FUROSEMIDE 10 MG/HR: 10 INJECTION, SOLUTION INTRAMUSCULAR; INTRAVENOUS at 03:04

## 2017-01-01 RX ADMIN — AMIODARONE HYDROCHLORIDE 400 MG: 200 TABLET ORAL at 10:04

## 2017-01-01 RX ADMIN — EPINEPHRINE 1 MG: 1 INJECTION INTRAMUSCULAR; INTRAVENOUS; SUBCUTANEOUS at 03:04

## 2017-01-01 RX ADMIN — AMIODARONE HYDROCHLORIDE 400 MG: 200 TABLET ORAL at 08:04

## 2017-01-01 RX ADMIN — ACETAMINOPHEN 325 MG: 325 TABLET ORAL at 12:04

## 2017-01-01 RX ADMIN — SODIUM CHLORIDE, PRESERVATIVE FREE 3 ML: 5 INJECTION INTRAVENOUS at 09:04

## 2017-01-01 RX ADMIN — LIDOCAINE HYDROCHLORIDE 1 MG/MIN: 8 INJECTION, SOLUTION INTRAVENOUS at 04:04

## 2017-01-01 RX ADMIN — MEXILETINE HYDROCHLORIDE 200 MG: 200 CAPSULE ORAL at 09:04

## 2017-01-01 RX ADMIN — SODIUM BICARBONATE 50 MEQ: 84 INJECTION, SOLUTION INTRAVENOUS at 02:04

## 2017-01-01 RX ADMIN — ACETAMINOPHEN 325 MG: 325 TABLET ORAL at 05:04

## 2017-01-01 RX ADMIN — MEXILETINE HYDROCHLORIDE 200 MG: 200 CAPSULE ORAL at 10:04

## 2017-01-01 RX ADMIN — SODIUM BICARBONATE 50 MEQ: 84 INJECTION, SOLUTION INTRAVENOUS at 03:04

## 2017-01-01 RX ADMIN — MORPHINE SULFATE 1 MG/HR: 10 INJECTION INTRAMUSCULAR; INTRAVENOUS; SUBCUTANEOUS at 06:04

## 2017-01-01 RX ADMIN — LIDOCAINE HYDROCHLORIDE 1 MG/MIN: 8 INJECTION, SOLUTION INTRAVENOUS at 02:04

## 2017-01-01 RX ADMIN — FUROSEMIDE 10 MG/HR: 10 INJECTION, SOLUTION INTRAMUSCULAR; INTRAVENOUS at 04:04

## 2017-01-01 RX ADMIN — FUROSEMIDE 40 MG: 40 TABLET ORAL at 08:04

## 2017-01-01 RX ADMIN — Medication 0.05 MCG/KG/MIN: at 12:04

## 2017-01-01 RX ADMIN — ATROPINE SULFATE 1 MG: 1 INJECTION, SOLUTION INTRAMUSCULAR; INTRAVENOUS; SUBCUTANEOUS at 03:04

## 2017-01-01 RX ADMIN — FUROSEMIDE 80 MG: 10 INJECTION, SOLUTION INTRAMUSCULAR; INTRAVENOUS at 01:04

## 2017-01-01 RX ADMIN — EPINEPHRINE 1 MG: 1 INJECTION INTRAMUSCULAR; INTRAVENOUS; SUBCUTANEOUS at 02:04

## 2017-01-01 RX ADMIN — MEXILETINE HYDROCHLORIDE 200 MG: 200 CAPSULE ORAL at 01:04

## 2017-01-01 RX ADMIN — POTASSIUM CHLORIDE 20 MEQ: 1500 TABLET, EXTENDED RELEASE ORAL at 05:04

## 2017-01-01 RX ADMIN — FUROSEMIDE 80 MG: 10 INJECTION, SOLUTION INTRAMUSCULAR; INTRAVENOUS at 04:04

## 2017-01-01 RX ADMIN — ALPRAZOLAM 0.5 MG: 0.5 TABLET ORAL at 04:04

## 2017-01-01 RX ADMIN — ALPRAZOLAM 0.5 MG: 0.5 TABLET ORAL at 06:04

## 2017-01-01 RX ADMIN — VASOPRESSIN 20 UNITS: 20 INJECTION INTRAVENOUS at 06:04

## 2017-01-01 RX ADMIN — ATROPINE SULFATE 1 MG: 1 INJECTION, SOLUTION INTRAMUSCULAR; INTRAVENOUS; SUBCUTANEOUS at 02:04

## 2017-01-01 RX ADMIN — NOREPINEPHRINE BITARTRATE 4000 MCG: 1 INJECTION, SOLUTION, CONCENTRATE INTRAVENOUS at 04:04

## 2017-01-01 RX ADMIN — MIDAZOLAM HYDROCHLORIDE: 1 INJECTION, SOLUTION INTRAMUSCULAR; INTRAVENOUS at 03:04

## 2017-01-01 RX ADMIN — FUROSEMIDE 80 MG: 10 INJECTION, SOLUTION INTRAMUSCULAR; INTRAVENOUS at 06:04

## 2017-01-01 RX ADMIN — ALPRAZOLAM 0.5 MG: 0.5 TABLET ORAL at 10:04

## 2017-01-01 RX ADMIN — FUROSEMIDE 15 MG/HR: 10 INJECTION, SOLUTION INTRAMUSCULAR; INTRAVENOUS at 06:04

## 2017-01-01 RX ADMIN — AMIODARONE HYDROCHLORIDE 300 MG: 50 INJECTION, SOLUTION INTRAVENOUS at 03:04

## 2017-01-01 RX ADMIN — MEXILETINE HYDROCHLORIDE 200 MG: 200 CAPSULE ORAL at 05:04

## 2017-01-01 RX ADMIN — WARFARIN SODIUM 5 MG: 5 TABLET ORAL at 05:04

## 2017-01-01 RX ADMIN — SODIUM CHLORIDE 250 ML: 0.9 INJECTION, SOLUTION INTRAVENOUS at 01:04

## 2017-01-01 RX ADMIN — FUROSEMIDE 80 MG: 10 INJECTION, SOLUTION INTRAMUSCULAR; INTRAVENOUS at 10:04

## 2017-01-01 RX ADMIN — ALPRAZOLAM 0.5 MG: 0.5 TABLET ORAL at 08:04

## 2017-01-01 RX ADMIN — POTASSIUM CHLORIDE 20 MEQ: 1500 TABLET, EXTENDED RELEASE ORAL at 06:04

## 2017-01-01 RX ADMIN — PANTOPRAZOLE SODIUM 40 MG: 40 TABLET, DELAYED RELEASE ORAL at 04:04

## 2017-04-02 PROBLEM — I47.20 VENTRICULAR TACHYCARDIA: Status: ACTIVE | Noted: 2017-01-01

## 2017-04-03 PROBLEM — I24.0 LV (LEFT VENTRICULAR) MURAL THROMBUS WITHOUT MI: Status: ACTIVE | Noted: 2017-01-01

## 2017-04-03 PROBLEM — I25.5 ISCHEMIC CARDIOMYOPATHY: Status: ACTIVE | Noted: 2017-01-01

## 2017-04-03 PROBLEM — I13.0 BENIGN HYPERTENSIVE HEART AND CKD, STAGE 3 (GFR 30-59), W CHF: Status: ACTIVE | Noted: 2017-01-01

## 2017-04-03 PROBLEM — Z95.810 CARDIAC RESYNCHRONIZATION THERAPY DEFIBRILLATOR (CRT-D) IN PLACE: Status: ACTIVE | Noted: 2017-01-01

## 2017-04-03 PROBLEM — Z95.810 S/P ICD (INTERNAL CARDIAC DEFIBRILLATOR) PROCEDURE: Status: ACTIVE | Noted: 2017-01-01

## 2017-04-03 PROBLEM — I50.22 CHRONIC SYSTOLIC HEART FAILURE: Status: ACTIVE | Noted: 2017-01-01

## 2017-04-03 PROBLEM — N18.30 BENIGN HYPERTENSIVE HEART AND CKD, STAGE 3 (GFR 30-59), W CHF: Status: ACTIVE | Noted: 2017-01-01

## 2017-04-03 PROBLEM — Z95.1 S/P CABG (CORONARY ARTERY BYPASS GRAFT): Status: ACTIVE | Noted: 2017-01-01

## 2017-04-03 NOTE — PROGRESS NOTES
Notified MD Hale of patients PTT result 26.6. Ordered to continue holding heparin until echo read is complete.

## 2017-04-03 NOTE — PLAN OF CARE
Problem: Patient Care Overview  Goal: Plan of Care Review  Outcome: Ongoing (interventions implemented as appropriate)  Patient alert - appears delirious this AM.  Easily reoriented.  Patient did not sleep at all last night.  Lidocaine and lasix infusing per eMAR.  BC, UC, labs sent.  AICD interrogated by Dr. Guzman.  CXR completed.  Blount changed out.  Patient reports anxiety - worried about her dog in [place] and her bills not getting paid.  BM x 3 overnight - patient attributes frequent BMs to her nerves.  Blount draining ~25cc/hr clear yellow urine.  Patient awaiting EP c/s so she can be updated on POC.

## 2017-04-03 NOTE — CONSULTS
Electrophysiology Consult Note  Attending Physician: Fernanda Hopkins MD  Reason for Consult: SVT vs. VT at OSH     HPI:   77 y.o. woman with PMH CAD s/p CABGx2 1999, ICM s/p SC Biotronik AICD implanted on 3/23/2017, CKD 3-4, who was transferred from [place] overnight for management of ventricular management. EP now on consult.     Per OSH records, patient had a outpatient device check on 3/30/2017, at which time she was noted to be tachycardic to the 160's. Device was interrogated, and concern for VT, so attempt with device DF was made, however device would not provide shock as rhythm noted to be SVT. External DF at 200J was done which converted patient to a NSR in the 50's She was promptly transferred to [facility] and admitted to the ICU. After admission, patient was noted to have several runs of wide-complex tachycardias, loaded with IV amiodarone and started on an amiodarone gtt. She was also found to have an NSTEMI with a troponin of 20, and per documentation was taken for [facility] where no intervention was performed. She was subsequently started on a lidocaine gtt and transitioned to PO amiodarone, and [place] [facility] [place]. She also had a reported LV thrombus seen on OSH TTE.    Arrived with lidocaine @ 1 mg/min,  On amiodarone  mg BID.  On heparin gtt and started on coumadin.    Overnight device was interrogated and showed 3 episodes of SVT occurring on 3/30/2017, all without any therapy. These rhythms seem consistent with SVT with abberancy, possibly AVNRT with PVC's vs. AT, although A sensing not as clear with single-lead AICD. Device was set at VVI at 60 bpm.    ECG from OSH on 3/30/2017 @ 11:11 show wide complex tachycardia with rate 161 bpm, RBBB morphology.  ECG from OSH on 3/30/2017 @ 11:30 show wide complex tachycardia with rate 146 bpm, possible fusion complexes, RBBB morphology.  ECG from OSH on 3/30/2017 @ 13:19 show wide complex tachycardia with rate 132  bpm, RBBB morphology, PVC.  ECG's from OSH on 4/1/2017 @ 00:14 show wide complex tachycardia with rate 135 bpm, RBBB morphology.  ECG's from OSH on 4/1/2017 @ 09:23 show wide complex tachycardia with rate 108 bpm, RBBB morphology.    This morning patient was still pacing at VVI 60.  Device reprogrammed to VVI at 30 to assess underlying rhythm.  12-lead rhythm strip shows sinus bradycardia with first degree AV block, LBBB morphology with QRS ~150ms.    Patient currently asymptomatic, denies any chest pain, shortness of breath, or palpitations.  Telemetry since arrival shows paced rhythm in the 60's, since reprogramming, down to the 40's. B/P stable in the 120's to 130's SBP.    ROS:    Constitution: Negative for fever, chills, weight loss or gain.   HENT: Negative for sore throat, rhinorrhea, or headache.  Eyes: Negative for blurred or double vision.   Cardiovascular: See above  Pulmonary: Negative for SOB   Gastrointestinal: Negative for abdominal pain, nausea, vomiting, or diarrhea.   : Negative for dysuria.   Neurological: Negative for focal weakness or sensory changes.  PMH:   No past medical history on file.  No past surgical history on file.  Allergies:   Review of patient's allergies indicates:  No Known Allergies  Medications:     No current facility-administered medications on file prior to encounter.      No current outpatient prescriptions on file prior to encounter.       Inpatient Medications   Continuous Infusions:   furosemide (LASIX) 5 mg/mL infusion (non-titrating) 10 mg/hr (04/03/17 1200)    heparin (porcine) in D5W Stopped (04/03/17 0230)    lidocaine 1 mg/min (04/03/17 1200)     Scheduled Meds:   amiodarone  400 mg Oral BID    aspirin  81 mg Oral Daily    sodium chloride 0.9%  3 mL Intravenous Q8H    warfarin  5 mg Oral Daily     PRN Meds:     Social History:     Social History   Substance Use Topics    Smoking status: Not on file    Smokeless tobacco: Not on file    Alcohol use Not on  file     Family History:   No family history on file.  Physical Exam:     Vitals:  Temp:  [95 °F (35 °C)-98.5 °F (36.9 °C)]   Pulse:  [47-60]   Resp:  [20-34]   BP: (108-139)/(56-71)   SpO2:  [92 %-97 %]  I/O's:    Intake/Output Summary (Last 24 hours) at 04/03/17 1515  Last data filed at 04/03/17 1200   Gross per 24 hour   Intake            92.53 ml   Output              415 ml   Net          -322.47 ml        Constitutional: NAD, conversant  HEENT: Sclera anicteric, PERRLA, EOMI  Neck: 12-14 cm JVD, no carotid bruits  CV: Franco, no murmur, normal S1/S2  Pulm: Bibasilar crackles  GI: Abdomen soft, NTND, +BS  Extremities: 1+ LE edema, warm and well perfused  Skin: No ecchymosis, erythema, or ulcers    Labs:       Recent Labs  Lab 04/03/17  0057   *   K 5.1   CL 90*   CO2 13*   BUN 72*   CREATININE 4.1*   ANIONGAP 19*       Recent Labs  Lab 04/03/17  0057   *   *   ALKPHOS 82   BILITOT 0.6   ALBUMIN 2.8*       Recent Labs  Lab 04/03/17  0057   BNP 2705*      Recent Labs  Lab 04/03/17  0057   WBC 18.27*   HGB 11.5*   HCT 32.3*      GRAN 85.5*  15.6*       Recent Labs  Lab 04/03/17 0057   INR 2.1*     Lab Results   Component Value Date    CHOL 83 (L) 04/03/2017    HDL 35 (L) 04/03/2017    LDLCALC 40.2 (L) 04/03/2017    TRIG 39 04/03/2017     No results found for: HGBA1C     Micro:  Blood Cultures  Lab Results   Component Value Date    LABBLOO No Growth to date 04/03/2017    LABBLOO No Growth to date 04/03/2017     Urine Cultures  No results found for: LABURIN    Imaging:   TTE (4/3/2017)  Technical Quality: This study was performed in conjunction with a 3ml intravenous injection of Optison contrast agent because of poor endocardial visualization.     General: A catheter is present in the right-sided cardiac chambers.     Aorta: The aortic root is normal in size, measuring 2.5 cm at sinotubular junction and 2.6 cm at Sinuses of Valsalva. The proximal ascending aorta is normal in size,  measuring 2.6 cm across.     Left Atrium: The left atrial volume index is severely enlarged, measuring 80.38 cc/m2.     Left Ventricle: The left ventricle is normal in size, with an end-diastolic diameter of 5.0 cm, and an end-systolic diameter of 4.5 cm. LV wall thickness is normal, with the septum and the posterior wall each measuring 1.0 cm across. Relative wall thickness was normal at 0.40, and the LV mass index was increased at 128.4 g/m2 consistent with eccentric left ventricular hypertrophy. The inferior wall is akinetic. The apex is dyskinetic. The following segments were akinetic: basal inferoseptum. Global left ventricular systolic function appears severely depressed. Visually estimated ejection fraction is 20-25%. The LV Doppler derived stroke volume equals 25.0 ccs. The E/e'(lat) is 25, consistent with significant diastolic dysfunction.     Right Atrium: The right atrium is normal in size, measuring 4.6 cm in length and 4.2 cm in width in the apical view.     Right Ventricle: The right ventricle is normal in size measuring 3.4 cm at the base in the apical right ventricle-focused view. Global right ventricular systolic function was not well seen. The estimated PA systolic pressure is greater than 40 mmHg.     Aortic Valve:  The aortic valve is normal in structure. Additionally, there is mild aortic regurgitation.     Mitral Valve:  The mitral valve is normal in structure. There is mild to moderate mitral regurgitation. There is mitral annular calcification.     Tricuspid Valve:  The tricuspid valve is normal in structure. There is mild tricuspid regurgitation.     Pulmonary Valve:  The pulmonic valve is not well seen.     IVC: The IVC is not visualized.     Intracavitary: There is no evidence of pericardial effusion, intracavity mass, thrombi, or vegetation.     Other: There is a bilateral pleural effusion present.     CONCLUSIONS     1 - Eccentric LVH with severely depressed left ventricular systolic  function (EF 20-25%); can not exclude LV thrombus.     2 - Severe left atrial enlargement.     3 - Left ventricular diastolic dysfunction.     4 - Mild aortic regurgitation.     5 - Mild to moderate mitral regurgitation.     6 - Mild tricuspid regurgitation.     7 - The estimated PA systolic pressure is greater than 40 mmHg.     8 - Bilateral pleural effusion.     EF   Date Value Ref Range Status   2017 20 (A) 55 - 65      EK-lead rhythm strip shows sinus bradycardia with first degree AV block, LBBB morphology with QRS ~150ms    Telemetry: Telemetry since arrival shows paced rhythm in the 60's, since reprogramming, down to the 40's. B/P stable in the 120's to 130's SBP    Assessment:   77 y.o. woman with PMH CAD s/p CABGx2 , ICM s/p SC Biotronik AICD implanted on 3/23/2017, CKD 3-4, who was transferred from [place] overnight for management of ventricular management. EP now on consult.     Arrived with lidocaine @ 1 mg/min,  On amiodarone  mg BID.  On heparin gtt and started on coumadin.    Overnight device was interrogated and showed 3 episodes of SVT occurring on 3/30/2017, all without any therapy. These rhythms seem consistent with SVT with abberancy, possibly AVNRT with PVC's vs. AT, although A sensing not as clear with single-lead AICD. Device was set at VVI at 60 bpm.    ECG from OSH on 3/30/2017 @ 11:11 show wide complex tachycardia with rate 161 bpm, RBBB morphology.  ECG from OSH on 3/30/2017 @ 11:30 show wide complex tachycardia with rate 146 bpm, possible fusion complexes, RBBB morphology.  ECG from OSH on 3/30/2017 @ 13:19 show wide complex tachycardia with rate 132 bpm, RBBB morphology, PVC.  ECG's from OSH on 2017 @ 00:14 show wide complex tachycardia with rate 135 bpm, RBBB morphology.  ECG's from OSH on 2017 @ 09:23 show wide complex tachycardia with rate 108 bpm, RBBB morphology.    This morning patient was still pacing at VVI 60.  Device reprogrammed to VVI at 30 to  assess underlying rhythm.  12-lead rhythm strip shows sinus bradycardia with first degree AV block, LBBB morphology with QRS ~150ms.  Telemetry since arrival shows paced rhythm in the 60's, since reprogramming, down to the 40's. B/P stable in the 120's to 130's SBP.    Plan:   Wide complex tachycardia  - Unclear if this is SVT with abberancy, possibly AVNRT vs. AT, or a true VT as fusion complexes can be seen on surface ECG  - Obtain cath films from OSH as unclear what is her coronary anatomy per faxed records  - Continue amiodarone  mg BID and lidocaine gtt @ 1mg/min  - Continue to monitor on telemetry  - May need AICD upgrade to DC vs BiV, may consider EPS +/- VT ablation if we can better discern wide-complex tachycardia  - Additional rec's to follow    Patient seen and examined this morning. Thank you for the opportunity to participate in the care of this interesting patient. Discussed with Dr. Brown, staff attestation to follow.    Signed:  Duke Mcgee MD  Cardiology Fellow, PGY-4  Pager: [phone]  4/3/2017 3:15 PM

## 2017-04-03 NOTE — PROGRESS NOTES
Heart Failure Progress Note  Attending Physician: Fernanda Hopkins MD  Hospital Day: 1    Subjective:   Interval History: Patient seen and examined at bedside. No acute events overnight, no chest pain, SOB, fever, chills.    Medications:   Continuous Infusions:   furosemide (LASIX) 5 mg/mL infusion (non-titrating) 10 mg/hr (04/03/17 1200)    heparin (porcine) in D5W Stopped (04/03/17 0230)    lidocaine 1 mg/min (04/03/17 1200)       Scheduled Meds:   amiodarone  400 mg Oral BID    aspirin  81 mg Oral Daily    sodium chloride 0.9%  3 mL Intravenous Q8H    warfarin  5 mg Oral Daily     PRN Meds:  Objective:     Vitals:  Temp:  [95 °F (35 °C)-98.5 °F (36.9 °C)]   Pulse:  [47-60]   Resp:  [20-34]   BP: (108-139)/(56-71)   SpO2:  [92 %-97 %]  on 3.5 L NC I/O's:    Intake/Output Summary (Last 24 hours) at 04/03/17 1425  Last data filed at 04/03/17 1200   Gross per 24 hour   Intake            92.53 ml   Output              415 ml   Net          -322.47 ml        Constitutional: NAD, conversant  HEENT: Sclera anicteric, PERRLA, EOMI  Neck: JVD to ear, no carotid bruits  CV: RRR, no murmur  Pulm: Bibasilar crackles  GI: Abdomen soft, NTND, +BS  Extremities: Trace LE edema, warm and well perfused, 2+ distal pulses  Skin: No ecchymosis, erythema, or ulcers  Psych: AOx3, appropriate affect    Labs:       Recent Labs  Lab 04/03/17 0057   *   K 5.1   CL 90*   CO2 13*   BUN 72*   CREATININE 4.1*   *   ANIONGAP 19*       Recent Labs  Lab 04/03/17 0057   *   *   ALKPHOS 82   BILITOT 0.6   ALBUMIN 2.8*        Recent Labs  Lab 04/03/17 0057   WBC 18.27*   HGB 11.5*   HCT 32.3*      GRAN 85.5*  15.6*       Recent Labs  Lab 04/03/17 0057   INR 2.1*       Recent Labs  Lab 04/03/17 0057   BNP 2705*      Micro:   Blood Cultures  Lab Results   Component Value Date    LABBLOO No Growth to date 04/03/2017    LABBLOO No Growth to date 04/03/2017     Urine Cultures  No results found for:  "LABURIN    Imaging:     EF   Date Value Ref Range Status   04/03/2017 20 (A) 55 - 65        EKG: Sinus jimy, first degree AVB with LBBB    Telemetry: Sinus jimy in the 40s    Assessment:    76 yo female with PMH of MI(remote), HTN, CABG x 2(1999), CKD 3-4, s/p ICD 3/23/17, heavy tobacco use who is transferred to [facility] for ventricular tachycardia management.      Plan:   VT  - Continue lidocaine at 1mg/min, level pending  - Transition to amio PO  - EP to evaluate this AM, consult placed  - Currently without any VT or ectopy  - Device interrogated, 3 episodes of "SVT" noted however on EGM no atrial activity noted and CL as low as 330s, all from 3/30, underlying rhythm appears to be sinus jimy 40-50     CAD s/p CABG and remote MI, s/p ICD 3/23/17  - Per patient and family LHC at OSH was "normal", obtain outside cath films as report without anatomy  - Follow up records from OSH  - AST/ALT up, hold statin at this time  - Start ASA  - Decompensated so holding BB given unknown medication history     Leukocytosis  - etiology unclear, patient is normotensive and afebrile  - may be acute phase from VT and shock vs infection  - urine and blood cultures sent, CXR pending  - will not initiate ABX at this time, continue to monitor     ICM  - Patient is warm on exam with good distal pulses however JVD to ear and crackles at bilateral bases, labs significant for elevated transaminases, also has elevated INR but on coumadin(does not know last dose)  - Lasix 80 followed by gtt     SHANNAN with history of CKD 3  - currently Cr 4.1, may be partly due to cardiorenal based on exam  - will monitor on diuretics to see if any improvement     Hyponatremia  - Suspect 2/2 to heart failure given volume status, albumin on the low side and patient appears deconditioned so solute state may also be contributing   - urine lytes sent, osms sent  - Repeat BMP in afternoon     LV Apical Layer Thrombus  - 2D echo w/ CFD repeated with contrast  - patient " was transferred on heparin drip  - currently PTT and Xa are in range and INR is 2.1, holding AC, continue coumadin      Signed:  Kameron Ely MD  PGY-4  Cardiovascular Disease Fellow

## 2017-04-03 NOTE — PLAN OF CARE
Problem: Patient Care Overview  Goal: Plan of Care Review  Outcome: Ongoing (interventions implemented as appropriate)  PPM programmed at bedside today. Remains on lidocaine and furosemide infusions. 2L NC. Sinus bradycardia. Patient progressing towards goals, care plan reviewed with patient and family, all questions and concerns addressed.

## 2017-04-03 NOTE — H&P
"      Heart Failure History and Physical  Attending Physician: Fernanda Hopkins MD  Chief Complaint: Ventricular Tachycardia     HPI:   History limited as OSH did not provide records and patient is only aware of certain elements of her medical history. Upon calling the hospital where the patient was transferred from, they were unable to provide records or even medication history until proper protocol for medical records authorization and transfer is completed.    76 yo female with PMH of MI(remote), HTN, CABG x 2(1999), CKD 3-4, s/p ICD 3/23/17,  heavy tobacco use who is transferred to [facility] for ventricular tachycardia management.     Per patient and family, treatment course started after a post device clinic visit on 3/30 when patient developed VT in the clinic and her device was unable to stop it so she was shocked. She was admitted to the hospital thereafter and placed in the ICU for monitoring. After transfer to the floor 3/31, that evening she developed VT again and returned to the ICU, receiving amiodarone and lidocaine. Per family patient had a LHC on 3/31 which was "normal" and an echo showing a blood clot in the "bottom" of the heart.     Currently the patient is free of chest pain and feeling well. She denies shortness of breath is able to lay flat. Denies leg swelling.  ROS:    Constitution: Negative for fever, chills, weight loss or gain.   HENT: Negative for sore throat, rhinorrhea, or headache.  Eyes: Negative for blurred or double vision.   Cardiovascular: See above  Pulmonary: Negative for SOB   Gastrointestinal: Negative for abdominal pain, nausea, vomiting, or diarrhea.   : Negative for dysuria.   Neurological: Negative for focal weakness or sensory changes.  PMH:   No past medical history on file.  No past surgical history on file.  Allergies:   Review of patient's allergies indicates:  No Known Allergies  Medications:     No current facility-administered medications on file prior to encounter.  "     No current outpatient prescriptions on file prior to encounter.       Inpatient Medications   Continuous Infusions:   heparin (porcine) in D5W      lidocaine       Scheduled Meds:   sodium chloride 0.9%  3 mL Intravenous Q8H     PRN Meds:     Social History:     Social History   Substance Use Topics    Smoking status: Not on file    Smokeless tobacco: Not on file    Alcohol use Not on file     Family History:   No family history on file.  Physical Exam:     Vitals:  Pulse:  [60]   Resp:  [20-22]   BP: (113-132)/(56-64)   SpO2:  [93 %]  I/O's:    Intake/Output Summary (Last 24 hours) at 04/03/17 0200  Last data filed at 04/03/17 0155   Gross per 24 hour   Intake                0 ml   Output              150 ml   Net             -150 ml        Constitutional: NAD, conversant  HEENT: Sclera anicteric, PERRLA, EOMI  Neck: JVD to ear, no carotid bruits  CV: RRR, no murmur  Pulm: Bibasilar crackles  GI: Abdomen soft, NTND, +BS  Extremities: Trace LE edema, warm and well perfused, 2+ distal pulses  Skin: No ecchymosis, erythema, or ulcers  Psych: AOx3, appropriate affect    Labs:       Recent Labs  Lab 04/03/17  0057   *   K 5.1   CL 90*   CO2 13*   BUN 72*   CREATININE 4.1*   ANIONGAP 19*       Recent Labs  Lab 04/03/17  0057   *   *   ALKPHOS 82   BILITOT 0.6   ALBUMIN 2.8*       Recent Labs  Lab 04/03/17  0057   BNP 2705*      Recent Labs  Lab 04/03/17  0057   WBC 18.27*   HGB 11.5*   HCT 32.3*      GRAN 85.5*  15.6*       Recent Labs  Lab 04/03/17  0057   INR 2.1*     No results found for: CHOL, HDL, LDLCALC, TRIG  No results found for: HGBA1C     Micro:  Blood Cultures  No results found for: LABBLOO  Urine Cultures  No results found for: LABURIN    Imaging:       No results found for: EF    EKG: pending    Telemetry: paced at 60    Assessment:   78 yo female with PMH of MI(remote), HTN, CABG x 2(1999), CKD 3-4, s/p ICD 3/23/17,  heavy tobacco use who [place] [facility] [place]cardia management.     Plan:   VT  - Continue lidocaine at 1mg/min, level pending  - Transition to amio PO  - EP to evaluate this AM for possible VT ablation, consult placed  - Currently without any VT or ectopy  - Device interrogated, 3 episodes of "SVT" noted however on EGM no atrial activity noted and CL as low as 330s, all from 3/30, underlying rhythm appears to be sinus jimy 40-50    CAD s/p CABG and remote MI, s/p ICD 3/23/17  - Per patient and family LHC at OSH was "normal"  - Follow up records from OSH  - AST/ALT up, hold statin at this time  - Start ASA  - Decompensated so holding BB given unknown medication history    Leukocytosis  - etiology unclear, patient is normotensive and afebrile  - may be acute phase from VT and shock vs infection  - urine and blood cultures sent, CXR pending  - will not initiate ABX at this time    ICM  - Patient is warm on exam with good distal pulses however JVD to ear and crackles at bilateral bases, labs significant for elevated transaminases, also has elevated INR but on coumadin(does not know last dose)  - Lasix 80 followed by gtt    SHANNAN with history of CKD 3  - currently Cr 4.1, may be partly due to cardiorenal based on exam  - will monitor on diuretics to see if any improvement    Hyponatremia  - Suspect 2/2 to heart failure given volume status, albumin on the low side and patient appears deconditioned so solute state may also be contributing   - urine lytes sent, osms sent    History of blood clot on echo(per family)  - patient was transferred on heparin drip  - currently PTT and Xa are in range and INR is 2.1, holding AC    Discussed with Dr. De La Torre and Dr. Patton    Signed:  Dre Guzman DO  Cardiology Fellow  Pager - [phone]  4/3/2017 2:00 AM    "

## 2017-04-03 NOTE — PROGRESS NOTES
Patient arrived to room 3072 via stretcher with EMS.  Patient slid over to ICU bed.  Ms. Epps is awake and alert - answers questions appropriately.  2 new IVs placed.  Vitals taken.  Dr. Guzman with [facility] called to notify of arrival.  Awaiting new orders.  Will monitor closely.

## 2017-04-03 NOTE — PROGRESS NOTES
MD with EP adjusted permanent pacemaker to lower rate of VVI 30. Pt states she feels no different. Will continue to monitor.

## 2017-04-03 NOTE — PROGRESS NOTES
Called to bedside to administer Echo contrast.  Patient identified by 2 identifiers. Denies previous reactions to blood transfusions and allergies reviewed.  Procedure explained & consent obtained.  22 g IV in place to Rt FA, flushed w/ 10cc NS pre & post contrast administration.  3cc Optison administered, echo images obtained.  Pt tolerated procedure well.

## 2017-04-04 NOTE — NURSING
Release of Information sent to [facility] at 10:12 AM by email ([email or web address]) and at 11:50 AM by fax to [phone].  Contacted Mirian with Medical Records at [facility] to stress the urgency that angiogram disk be sent to [facility] ASAP.

## 2017-04-04 NOTE — PHYSICIAN QUERY
"PT Name: Maty Epps  MR #: [id number]    Physician Query Form - Heart  Condition Clarification     CDS/: Geri Espinoza RN CDS             Contact information: Ext. [phone]  This form is a permanent document in the medical record.     Query Date: April 4, 2017    By submitting this query, we are merely seeking further clarification of documentation. Please utilize your independent clinical judgment when addressing the question(s) below.    The medical record contains the following   Indicators     Supporting Clinical Findings Location in Medical Record   x BNP 2705 Labs 4/3   x EF Eccentric LVH with severely depressed left ventricular systolic function (EF 20-25%) 2D Echo 4/3   x Radiology findings Mild CHF CXR 4/3   x Echo Results Severe left atrial enlargement.   - Left ventricular diastolic dysfunction with increased LAP.   - The estimated PA systolic pressure is greater than 40 mmHg.   2D Echo 4/3    "Ascites" documented      "SOB" or "SOTOMAYOR" documented      "Hypoxia" documented     x Heart Failure documented Suspect 2/2 to heart failure given volume status, albumin on the low side and patient appears deconditioned so solute state may also be contributing H & P 4/3   x "Edema" documented 1+ LE edema, warm and well perfused Cards MD CN 4/3   x Diuretics/Meds furosemide (LASIX) 500 mg in sodium chloride 0.9% 100 mL infusion    furosemide injection 80 mg (Once) MAR    Treatment:      Other:          Provider, please specify diagnosis or diagnoses associated with above clinical findings.                               [ x ] Acute Systolic Heart Failure ( EF < 40)*  [  ] Acute on Chronic Systolic Heart Failure ( EF < 40)*  [  ] Chronic Systolic Heart Failure (EF < 40)*  [  ] Acute Diastolic Heart Failure ( EF > 40)*  [  ] Acute on Chronic Diastolic Heart Failure( EF > 40)*  [  ] Chronic Diastolic Heart Failure (EF > 40)*  [  ] Acute Combined Systolic and Diastolic Heart Failure  [  ] Acute on Chronic Combined " Systolic and Diastolic Heart Failure  [  ] Chronic Combined Systolic and Diastolic Heart Failure  [  ] Other Type of Heart Failure (please specify type): _________________________  [  ] Heart Failure Ruled Out  [  ] Other (please specify): ___________________________________  [  ] Clinically Undetermined            *American Heart Association                                                                                                          Please document in your progress notes daily for the duration of treatment until resolved and include in your discharge summary.

## 2017-04-04 NOTE — PLAN OF CARE
Problem: Patient Care Overview  Goal: Plan of Care Review  Outcome: Ongoing (interventions implemented as appropriate)  Patient alert - very frustrated with POC.  Patient wants to go home.  Patient sleep well last night. Lidocaine and lasix infusing per eMAR. Blount draining 50-80cc/hr of yellow urine.  BM x 1 overnight.  OOB to recliner twice.  She ambulated to/from the bathroom once with assistance x 1.  Patient very impulsive and unsteady on her feet.  No acute events overnight.  Tylenol given once for back pain.

## 2017-04-04 NOTE — PROGRESS NOTES
Notified MD Hale of critical Lidocaine level of 8. MD placing order to discontinue lidocaine GTT and begin oral melixitine.

## 2017-04-04 NOTE — PLAN OF CARE
04/04/17 1446   Discharge Assessment   Assessment information obtained from? Caregiver;Patient   Communicated expected length of stay with patient/caregiver yes   Prior to hospitilization cognitive status: Alert/Oriented   Prior to hospitalization functional status: Independent   Current cognitive status: Alert/Oriented   Current Functional Status: Independent   Lives With alone   Able to Return to Prior Arrangements yes   Is patient able to care for self after discharge? Yes   How many people do you have in your home that can help with your care after discharge? 0   Who are your caregiver(s) and their phone number(s)? Marija Gonzalez [phone]   Patient's perception of discharge disposition home or selfcare   Readmission Within The Last 30 Days no previous admission in last 30 days   Patient currently being followed by outpatient case management? No   Patient currently receives home health services? No   Does the patient currently use HME? No   Patient currently receives private duty nursing? No   Patient currently receives any other outside agency services? No   Equipment Currently Used at Home none   Do you have any problems affording any of your prescribed medications? No   Is the patient taking medications as prescribed? yes   Do you have any financial concerns preventing you from receiving the healthcare you need? No   Does the patient have transportation to healthcare appointments? Yes   Transportation Available car;family or friend will provide   On Dialysis? No   Does the patient receive services at the Coumadin Clinic? No   Are there any open cases? No   Discharge Plan A Home with family   Discharge Plan B Home with family;Home Health   Patient/Family In Agreement With Plan yes     Met with patient in room 3072. Her son arrived in the middle of the discharge assessment. Sridhar Kwan (son) [phone]. The patient is a transfer from a [facility] in [facility] for AICD placement. She has a single  lead defibrillator currently however is in need of a 3 lead defib. She is completely independent however the case manger explained to the patient she will not be able to drive herself home upon discharge or for some time after discharge. She may require home health services. Her son lives in [place] and will be returning home on Friday. If she is still hospitalized on Friday and is being discharge she will need transportation home via wheelchair van or cab back to her home address of:  [address]  [place]    Her pharmacy of choice is:  [facility] in [place], [place]

## 2017-04-04 NOTE — PROGRESS NOTES
Heart Failure Progress Note  Attending Physician: Fernanda Hopkins MD  Hospital Day: 2    Subjective:   Interval History: Patient seen and examined at bedside. No acute events overnight, no chest pain, SOB, fever, chills. Slept well, more pleasant this AM.    Medications:   Continuous Infusions:   furosemide (LASIX) 5 mg/mL infusion (non-titrating) 10 mg/hr (04/04/17 0701)    lidocaine 1 mg/min (04/04/17 0701)       Scheduled Meds:   amiodarone  400 mg Oral BID    aspirin  81 mg Oral Daily    warfarin  5 mg Oral Daily     PRN Meds:  Objective:     Vitals:  Temp:  [92 °F (33.3 °C)-97.3 °F (36.3 °C)]   Pulse:  [45-48]   Resp:  [16-28]   BP: (111-139)/(55-75)   SpO2:  [90 %-97 %]  on 3.5 L NC I/O's:    Intake/Output Summary (Last 24 hours) at 04/04/17 1041  Last data filed at 04/04/17 1000   Gross per 24 hour   Intake           667.66 ml   Output             1451 ml   Net          -783.34 ml        Constitutional: NAD, conversant  HEENT: Sclera anicteric, PERRLA, EOMI  Neck: JVD to ear, no carotid bruits  CV: RRR, no murmur  Pulm: Bibasilar crackles  GI: Abdomen soft, NTND, +BS  Extremities: Trace LE edema, warm and well perfused, 2+ distal pulses  Skin: No ecchymosis, erythema, or ulcers  Psych: AOx3, appropriate affect    Labs:       Recent Labs  Lab 04/03/17 0057 04/03/17  1654 04/04/17  0410   * 123* 125*   K 5.1 4.4 4.3   CL 90* 91* 90*   CO2 13* 15* 17*   BUN 72* 89* 105*   CREATININE 4.1* 4.0* 4.0*   * 180* 132*   ANIONGAP 19* 17* 18*       Recent Labs  Lab 04/03/17 0057 04/04/17  0410   * 85*   * 292*   ALKPHOS 82 82   BILITOT 0.6 0.5   ALBUMIN 2.8* 2.7*        Recent Labs  Lab 04/03/17 0057 04/04/17  0410   WBC 18.27* 20.96*   HGB 11.5* 11.3*   HCT 32.3* 33.7*    256   GRAN 85.5*  15.6* 87.8*  18.3*       Recent Labs  Lab 04/03/17 0057 04/04/17 0410   INR 2.1* 2.0*       Recent Labs  Lab 04/03/17 0057   BNP 2705*      Micro:   Blood Cultures  Lab Results  "  Component Value Date    LABBLOO No Growth to date 04/03/2017    LABBLOO No Growth to date 04/03/2017    LABBLOO No Growth to date 04/03/2017    LABBLOO No Growth to date 04/03/2017     Urine Cultures  Lab Results   Component Value Date    LABURIN No growth 04/03/2017       Imaging:     EF   Date Value Ref Range Status   04/03/2017 20 (A) 55 - 65        EKG: Sinus jimy, first degree AVB with LBBB    Telemetry: Sinus jimy in the 40s    Assessment:    76 yo female with PMH of MI(remote), HTN, CABG x 2(1999), CKD 3-4, s/p ICD 3/23/17, heavy tobacco use who is transferred to [facility] for ventricular tachycardia management.      Plan:   VT  - Continue lidocaine at 1mg/min, consider transition to PO Mexitil  - Amiodarone 400 mg PO BID  - Patient seen by EP, appreciate recs  - Currently without any VT or ectopy  - Underlying rhythm from OSH likely VT, device without atrial lead, set to VVI @ 30 bpm by EP, may require upgrade to CRT-D, not candidate for endocardial VT RFA due to layered thrombus     CAD s/p CABG and remote MI, s/p ICD 3/23/17  - Per patient and family LHC at OSH was "normal", obtain outside cath films as report without anatomy  - AST/ALT up - Improving, hold statin at this time  - Continue ASA  - Decompensated so holding BB given unknown medication history     Leukocytosis  - etiology unclear, patient is normotensive and afebrile  - may be acute phase from VT and shock vs infection  - urine and blood cultures NGTD, CXR shows CHF  - will not initiate ABX at this time, continue to monitor     ICM  - Patient is warm on exam with good distal pulses however JVD to ear and crackles at bilateral bases, labs significant for elevated transaminases (Improving), also has elevated INR but on coumadin(does not know last dose)  - Lasix 80 followed by gtt     SHANNAN with history of CKD 3  - currently Cr 4.0, may be partly due to cardiorenal based on exam, baseline seems to be 2.0 from records from OSH  - will monitor on " diuretics to see if any improvement with diuresis     Hyponatremia  - Suspect 2/2 to heart failure given volume status, albumin on the low side and patient appears deconditioned so solute state may also be contributing   - Repeat BMP in afternoon     LV Apical Layer Thrombus  - patient was transferred on heparin drip  - INR is 2.0 today, continue coumadin, heparin gtt once subtherapeutic      Signed:  Kameron Ely MD  PGY-4  Cardiovascular Disease Fellow

## 2017-04-04 NOTE — PROGRESS NOTES
Electrophysiology Progress Note  Attending Physician: Fernanda Hopkins MD  Hospital Day: 2    Subjective:   Interval History: No events reported overnight. Telemetry with NSR, rates in the 40's, B/P in the 120's to 130's SBP. Still awaiting cath films from OSH.    Medications:   Continuous Infusions:   furosemide (LASIX) 5 mg/mL infusion (non-titrating) 10 mg/hr (04/04/17 0600)    lidocaine 1 mg/min (04/04/17 0600)       Scheduled Meds:   amiodarone  400 mg Oral BID    aspirin  81 mg Oral Daily    sodium chloride 0.9%  3 mL Intravenous Q8H    warfarin  5 mg Oral Daily     PRN Meds:acetaminophen, alprazolam  Objective:     Vitals:  Temp:  [92 °F (33.3 °C)-97.3 °F (36.3 °C)]   Pulse:  [45-60]   Resp:  [16-28]   BP: (111-139)/(56-75)   SpO2:  [90 %-97 %]  I/O's:    Intake/Output Summary (Last 24 hours) at 04/04/17 0927  Last data filed at 04/04/17 0600   Gross per 24 hour   Intake            340.5 ml   Output             1156 ml   Net           -815.5 ml        Constitutional: NAD, conversant  HEENT: Sclera anicteric, PERRLA, EOMI  Neck: 12-14 cm JVD, no carotid bruits  CV: Franco, no murmur, normal S1/S2  Pulm: Bibasilar crackles  GI: Abdomen soft, NTND, +BS  Extremities: 1+ LE edema, warm and well perfused  Skin: No ecchymosis, erythema, or ulcers    Labs:       Recent Labs  Lab 04/03/17 0057 04/03/17  1654 04/04/17  0410   * 123* 125*   K 5.1 4.4 4.3   CL 90* 91* 90*   CO2 13* 15* 17*   BUN 72* 89* 105*   CREATININE 4.1* 4.0* 4.0*   * 180* 132*   ANIONGAP 19* 17* 18*       Recent Labs  Lab 04/03/17 0057 04/04/17  0410   * 85*   * 292*   ALKPHOS 82 82   BILITOT 0.6 0.5   ALBUMIN 2.8* 2.7*        Recent Labs  Lab 04/03/17 0057 04/04/17 0410   WBC 18.27* 20.96*   HGB 11.5* 11.3*   HCT 32.3* 33.7*    256   GRAN 85.5*  15.6* 87.8*  18.3*       Recent Labs  Lab 04/03/17 0057 04/04/17 0410   INR 2.1* 2.0*       Recent Labs  Lab 04/03/17 0057   BNP 2705*      Micro:    Blood Cultures  Lab Results   Component Value Date    LABBLOO No Growth to date 04/03/2017    LABBLOO No Growth to date 04/03/2017    LABBLOO No Growth to date 04/03/2017    LABBLOO No Growth to date 04/03/2017     Urine Cultures  Lab Results   Component Value Date    LABURIN No growth 04/03/2017       Imaging:   TTE (4/3/2017)  Technical Quality: This study was performed in conjunction with a 3ml intravenous injection of Optison contrast agent because of poor endocardial visualization.     General: A catheter is present in the right-sided cardiac chambers.     Aorta: The aortic root is normal in size, measuring 2.5 cm at sinotubular junction and 2.6 cm at Sinuses of Valsalva. The proximal ascending aorta is normal in size, measuring 2.6 cm across.     Left Atrium: The left atrial volume index is severely enlarged, measuring 80.38 cc/m2.     Left Ventricle: The left ventricle is normal in size, with an end-diastolic diameter of 5.0 cm, and an end-systolic diameter of 4.5 cm. LV wall thickness is normal, with the septum and the posterior wall each measuring 1.0 cm across. Relative wall thickness was normal at 0.40, and the LV mass index was increased at 128.4 g/m2 consistent with eccentric left ventricular hypertrophy. The inferior wall is akinetic. The apex is dyskinetic.   The following segments were akinetic: basal inferoseptum. Global left ventricular systolic function appears severely depressed. Visually estimated ejection fraction is 20-25%. The LV Doppler derived stroke volume equals 25.0 ccs. The E/e'(lat) is 25, consistent with significant diastolic dysfunction.     Right Atrium: The right atrium is normal in size, measuring 4.6 cm in length and 4.2 cm in width in the apical view.     Right Ventricle: The right ventricle is normal in size measuring 3.4 cm at the base in the apical right ventricle-focused view. Global right ventricular systolic function was not well seen. The estimated PA systolic pressure  is greater than 40 mmHg.     Aortic Valve:  The aortic valve is normal in structure. Additionally, there is mild aortic regurgitation.     Mitral Valve:  The mitral valve is normal in structure. There is mild to moderate mitral regurgitation. There is mitral annular calcification.     Tricuspid Valve:  The tricuspid valve is normal in structure. There is mild tricuspid regurgitation.     Pulmonary Valve:  The pulmonic valve is not well seen.     IVC: The IVC is not visualized.     Intracavitary: There is no evidence of pericardial effusion, intracavity mass, thrombi, or vegetation.     Other: There is a bilateral pleural effusion present.     CONCLUSIONS     1 - Eccentric LVH with severely depressed left ventricular systolic function (EF 20-25%). There is layer apical thrombus.    2 - Severe left atrial enlargement.     3 - Left ventricular diastolic dysfunction with increased LAP.     4 - Mild aortic regurgitation.     5 - Mild to moderate mitral regurgitation.     6 - Mild tricuspid regurgitation.     7 - The estimated PA systolic pressure is greater than 40 mmHg.     8 - Bilateral pleural effusion.    EF   Date Value Ref Range Status   2017 20 (A) 55 - 65      EK-lead rhythm strip shows sinus bradycardia with first degree AV block, LBBB morphology with QRS ~150ms     Telemetry: NSR with rates in the 40's overnight    Assessment:   77 y.o. woman with PMH CAD s/p CABGx2 , ICM s/p SC Biotronik AICD implanted on 3/23/2017, CKD 3-4, who was transferred from [place] overnight for management of ventricular management. EP now on consult.      On lidocaine @ 1 mg/min,  On amiodarone  mg BID.  On Coumadin, INR therapeutic at 2.0     Initial device interrogation with 3 episodes of SVT occurring on 3/30/2017, all without any therapy. These rhythms seem consistent with SVT with abberancy, possibly AVNRT with PVC's vs. AT, although A sensing not as clear with single-lead AICD. Device was set at VVI at 60  bpm, later reporgrammed to VVI 30 yesterday.     This morning telemetry with NSR rates in the 40's.  12-lead rhythm strip shows sinus bradycardia with first degree AV block, LBBB morphology with QRS ~150ms.    Plan:   Wide complex tachycardia  - Unclear if this is SVT with abberancy, possibly AVNRT vs. AT, or a true VT as fusion complexes can be seen on surface ECG  - Awaiting cath films from OSH as unclear what is her coronary anatomy per faxed records  - Continue amiodarone  mg BID and lidocaine gtt @ 1mg/min, will consider transition to PO mexelitine  - Continue Coumadin for LV thrombus, INR currently therapeutic at 2.0  - Continue to monitor on telemetry  - Would consider EPS +/- VT ablation, though with layered LV thrombus cannot be done at this time  - Will consider AICD upgrade to BiV tomorrow if cleared by interventional cardiology  - NPO after midnight tonight    Patient seen and examined this morning. Thank you for the opportunity to participate in the care of this interesting patient. Discussed with Dr. Brown, staff attestation to follow.    Signed:  Duke Mcgee MD  Cardiology Fellow - PGY4  Pager: [phone]  4/4/2017 9:27 AM

## 2017-04-04 NOTE — PROGRESS NOTES
Unable to complete home medication section of admission assessment.  Patient does not recall her home medications or dosages.  Will have day shift ask her son, Sridhar, tomorrow (4/4).

## 2017-04-05 NOTE — CONSULTS
"  Nutrition consult stating "pt would benefit from talking to a dietitian."    Pt reports good appetite, stable wt PTA. Currently NPO for test.    Pt follows a low sodium diet at home, denied need for further questions.    RD to follow-up as needed.  "

## 2017-04-05 NOTE — PROGRESS NOTES
Heart Failure Progress Note  Attending Physician: Fernanda Hopkins MD  Hospital Day: 3    Subjective:   Interval History: Patient seen and examined at bedside. No acute events overnight, no chest pain, SOB, fever, chills. Diuresed well overnight on Lasix gtt at 20 mg / hr.     Medications:   Continuous Infusions:       Scheduled Meds:   amiodarone  400 mg Oral BID    aspirin  81 mg Oral Daily    mexiletine  200 mg Oral Q8H     PRN Meds:  Objective:     Vitals:  Temp:  [95.2 °F (35.1 °C)-97.4 °F (36.3 °C)]   Pulse:  [47-65]   Resp:  [15-26]   BP: (114-138)/(56-64)   SpO2:  [90 %-100 %]  on 3.5 L NC I/O's:    Intake/Output Summary (Last 24 hours) at 04/05/17 1422  Last data filed at 04/05/17 1300   Gross per 24 hour   Intake            535.5 ml   Output             3185 ml   Net          -2649.5 ml        Constitutional: NAD, conversant  HEENT: Sclera anicteric, PERRLA, EOMI  Neck: JVD ~ 12 cm, no carotid bruits  CV: RRR, no murmur  Pulm: Bibasilar crackles  GI: Abdomen soft, NTND, +BS  Extremities: Trace LE edema, warm and well perfused, 2+ distal pulses  Skin: No ecchymosis, erythema, or ulcers  Psych: AOx3, appropriate affect    Labs:       Recent Labs  Lab 04/04/17  0410 04/04/17  1423 04/05/17  0232   * 125* 132*   K 4.3 3.7 4.0   CL 90* 93* 96   CO2 17* 15* 16*   * 113* 119*   CREATININE 4.0* 3.7* 3.5*   * 126* 103   ANIONGAP 18* 17* 20*       Recent Labs  Lab 04/03/17  0057 04/04/17  0410 04/05/17  0232   * 85* 161*   * 292* 339*   ALKPHOS 82 82 98   BILITOT 0.6 0.5 0.6   ALBUMIN 2.8* 2.7* 2.7*        Recent Labs  Lab 04/03/17  0057 04/04/17  0410 04/05/17  0232   WBC 18.27* 20.96* 21.47*   HGB 11.5* 11.3* 13.1   HCT 32.3* 33.7* 35.9*    256 238   GRAN 85.5*  15.6* 87.8*  18.3* 85.0*       Recent Labs  Lab 04/03/17  0057 04/04/17  0410 04/05/17 0232   INR 2.1* 2.0* 4.3*       Recent Labs  Lab 04/03/17 0057   BNP 2705*      Micro:   Blood Cultures  Lab  "Results   Component Value Date    LABBLOO No Growth to date 04/03/2017    LABBLOO No Growth to date 04/03/2017    LABBLOO No Growth to date 04/03/2017    LABBLOO No Growth to date 04/03/2017    LABBLOO No Growth to date 04/03/2017    LABBLOO No Growth to date 04/03/2017     Urine Cultures  Lab Results   Component Value Date    LABURIN No growth 04/03/2017       Imaging:     EF   Date Value Ref Range Status   04/03/2017 20 (A) 55 - 65      Telemetry: Sinus jimy in the 50 - 60s    Assessment:    76 yo female with PMH of MI(remote), HTN, CABG x 2(1999), CKD 3-4, s/p ICD 3/23/17, heavy tobacco use who [place] [facility] [place].      Plan:   VT  - No further episodes since admission  - Amiodarone 400 mg PO BID, Mexetil 200 mg TID  - Patient seen by EP, appreciate recs  - Currently without any VT or ectopy  - Underlying rhythm from OSH likely VT, device without atrial lead, set to VVI @ 30 bpm by EP, may require upgrade to CRT-D, not candidate for endocardial VT RFA due to layered thrombus     CAD s/p CABG and remote MI, s/p ICD 3/23/17  - Per patient and family LHC at OSH was "normal", obtain outside cath films as report without anatomy  - AST/ALT up - Worsened overnight, hold statin at this time, monitor  - Continue ASA  - Decompensated so holding BB given unknown medication history     Leukocytosis  - etiology unclear, patient is normotensive and afebrile  - may be acute phase from VT and shock vs infection  - urine and blood cultures NGTD, CXR shows CHF  - will not initiate ABX at this time, continue to monitor     ICM  - Patient is warm on exam with good distal pulses however JVD and LE edema improved, labs significant for elevated transaminases   - Holding Lasix gtt due to overcorrection of Na level from diuresis  - Patient net - 2.5 L over 24 hour period     SHANNAN with history of CKD 3  - Improved from previous, 3.5 today following brisk diuresis  - Baseline seems to be 2.0, will " continue to monitor, holding diuresis due to overcorrection of hyponatremia     Hyponatremia  - Suspect 2/2 to heart failure given volume status, albumin on the low side and patient appears deconditioned so solute state may also be contributing   - Corrected from 125 to 132 overnight, holding Lasix gtt today, check BMP in afternoon, goal 6-8 mEq increase per day     LV Apical Layer Thrombus  - patient was transferred on heparin drip  - INR supratherapeutic at 4.3 today, hold coumadin, re-check in AM    Signed:  Kameron Ely MD  PGY-4  Cardiovascular Disease Fellow

## 2017-04-05 NOTE — PROGRESS NOTES
Electrophysiology Progress Note  Attending Physician: Fernanda Hopkins MD  Hospital Day: 3    Subjective:     Interval History: No events reported overnight. Telemetry with NSR, rates in the 50's-60's, B/P in the 120's to 130's SBP. Still awaiting cath films from OSH. INR now 4.3 this AM from 2.0 yesterday.    Medications:   Continuous Infusions:       Scheduled Meds:   amiodarone  400 mg Oral BID    aspirin  81 mg Oral Daily    mexiletine  200 mg Oral Q8H     PRN Meds:acetaminophen, alprazolam  Objective:     Vitals:  Temp:  [95.2 °F (35.1 °C)-97.4 °F (36.3 °C)]   Pulse:  [47-65]   Resp:  [15-26]   BP: (114-138)/(56-64)   SpO2:  [90 %-100 %]  I/O's:    Intake/Output Summary (Last 24 hours) at 04/05/17 1402  Last data filed at 04/05/17 1300   Gross per 24 hour   Intake            535.5 ml   Output             3185 ml   Net          -2649.5 ml        Constitutional: NAD, conversant  HEENT: Sclera anicteric, PERRLA, EOMI  Neck: 12-14 cm JVD, no carotid bruits  CV: Franco, no murmur, normal S1/S2  Pulm: Bibasilar crackles  GI: Abdomen soft, NTND, +BS  Extremities: 1+ LE edema, warm and well perfused  Skin: No ecchymosis, erythema, or ulcers    Labs:       Recent Labs  Lab 04/04/17  0410 04/04/17  1423 04/05/17  0232   * 125* 132*   K 4.3 3.7 4.0   CL 90* 93* 96   CO2 17* 15* 16*   * 113* 119*   CREATININE 4.0* 3.7* 3.5*   * 126* 103   ANIONGAP 18* 17* 20*       Recent Labs  Lab 04/03/17  0057 04/04/17  0410 04/05/17  0232   * 85* 161*   * 292* 339*   ALKPHOS 82 82 98   BILITOT 0.6 0.5 0.6   ALBUMIN 2.8* 2.7* 2.7*        Recent Labs  Lab 04/03/17 0057 04/04/17  0410 04/05/17  0232   WBC 18.27* 20.96* 21.47*   HGB 11.5* 11.3* 13.1   HCT 32.3* 33.7* 35.9*    256 238   GRAN 85.5*  15.6* 87.8*  18.3* 85.0*       Recent Labs  Lab 04/03/17 0057 04/04/17  0410 04/05/17  0232   INR 2.1* 2.0* 4.3*       Recent Labs  Lab 04/03/17 0057   BNP 2705*      Micro:   Blood  Cultures  Lab Results   Component Value Date    LABBLOO No Growth to date 04/03/2017    LABBLOO No Growth to date 04/03/2017    LABBLOO No Growth to date 04/03/2017    LABBLOO No Growth to date 04/03/2017    LABBLOO No Growth to date 04/03/2017    LABBLOO No Growth to date 04/03/2017     Urine Cultures  Lab Results   Component Value Date    LABURIN No growth 04/03/2017       Imaging:   TTE (4/3/2017)  Technical Quality: This study was performed in conjunction with a 3ml intravenous injection of Optison contrast agent because of poor endocardial visualization.     General: A catheter is present in the right-sided cardiac chambers.     Aorta: The aortic root is normal in size, measuring 2.5 cm at sinotubular junction and 2.6 cm at Sinuses of Valsalva. The proximal ascending aorta is normal in size, measuring 2.6 cm across.     Left Atrium: The left atrial volume index is severely enlarged, measuring 80.38 cc/m2.     Left Ventricle: The left ventricle is normal in size, with an end-diastolic diameter of 5.0 cm, and an end-systolic diameter of 4.5 cm. LV wall thickness is normal, with the septum and the posterior wall each measuring 1.0 cm across. Relative wall thickness was normal at 0.40, and the LV mass index was increased at 128.4 g/m2 consistent with eccentric left ventricular hypertrophy. The inferior wall is akinetic. The apex is dyskinetic.   The following segments were akinetic: basal inferoseptum. Global left ventricular systolic function appears severely depressed. Visually estimated ejection fraction is 20-25%. The LV Doppler derived stroke volume equals 25.0 ccs. The E/e'(lat) is 25, consistent with significant diastolic dysfunction.     Right Atrium: The right atrium is normal in size, measuring 4.6 cm in length and 4.2 cm in width in the apical view.     Right Ventricle: The right ventricle is normal in size measuring 3.4 cm at the base in the apical right ventricle-focused view. Global right ventricular  systolic function was not well seen. The estimated PA systolic pressure is greater than 40 mmHg.     Aortic Valve:  The aortic valve is normal in structure. Additionally, there is mild aortic regurgitation.     Mitral Valve:  The mitral valve is normal in structure. There is mild to moderate mitral regurgitation. There is mitral annular calcification.     Tricuspid Valve:  The tricuspid valve is normal in structure. There is mild tricuspid regurgitation.     Pulmonary Valve:  The pulmonic valve is not well seen.     IVC: The IVC is not visualized.     Intracavitary: There is no evidence of pericardial effusion, intracavity mass, thrombi, or vegetation.     Other: There is a bilateral pleural effusion present.     CONCLUSIONS     1 - Eccentric LVH with severely depressed left ventricular systolic function (EF 20-25%). There is layer apical thrombus.    2 - Severe left atrial enlargement.     3 - Left ventricular diastolic dysfunction with increased LAP.     4 - Mild aortic regurgitation.     5 - Mild to moderate mitral regurgitation.     6 - Mild tricuspid regurgitation.     7 - The estimated PA systolic pressure is greater than 40 mmHg.     8 - Bilateral pleural effusion.    EF   Date Value Ref Range Status   2017 20 (A) 55 - 65      EK-lead rhythm strip shows sinus bradycardia with first degree AV block, LBBB morphology with QRS ~150ms     Telemetry: NSR with rates in the 50's-60's overnight    Assessment:   77 y.o. woman with PMH CAD s/p CABGx2 , ICM s/p SC Biotronik AICD implanted on 3/23/2017, CKD 3-4, who was transferred from [place] overnight for management of ventricular management. EP now on consult.      On amiodarone  mg BID.  On mexiletine  mg TID.   On Coumadin, INR now supra-therapeutic at 4.3, currently holding.     Initial device interrogation with 3 episodes of SVT occurring on 3/30/2017, all without any therapy. These rhythms seem consistent with SVT with abberancy,  possibly AVNRT with PVC's vs. AT, although A sensing not as clear with single-lead AICD. Device was set at VVI at 60 bpm, later reporgrammed to VVI 30 yesterday.     12-lead rhythm strip shows sinus bradycardia with first degree AV block, LBBB morphology with QRS ~150ms.  This morning telemetry with NSR rates in the 50's-60's.    Plan:   Wide complex tachycardia  - Unclear if this is SVT with abberancy, possibly AVNRT vs. AT, or a true VT as fusion complexes can be seen on surface ECG  - Awaiting cath films from OSH as unclear what is her coronary anatomy per faxed records  - Continue amiodarone  mg BID and mexiletine 200 mg TID  - Continue Coumadin for LV thrombus, INR currently therapeutic at 4.3, hold for now  - Continue to monitor on telemetry  - Will plan AICD upgrade to BiV tomorrow if INR in low 3'scleared by interventional cardiology  - Would consider EPS +/- VT ablation, though with layered LV thrombus cannot be done at this time  - NPO after midnight tonight    Patient seen and examined this morning. Thank you for the opportunity to participate in the care of this interesting patient. Discussed with Dr. Brown, staff attestation to follow.    Signed:  Duke Mcgee MD  Cardiology Fellow - PGY4  Pager: [phone]  4/5/2017 2:06 PM

## 2017-04-05 NOTE — PLAN OF CARE
Problem: Patient Care Overview  Goal: Plan of Care Review  Outcome: Ongoing (interventions implemented as appropriate)  Patient alert and oriented.  Very pleasant.  She seems to have accepted the POC.  Patient slept well last night.  Lasix infusing per eMAR.  Blount draining an average of 160cc/hr of yellow urine. No BM - patient passing gas.  She ambulated to/from the bathroom once with assistance x 1.  Patient reports being bored.  Updated patient and son, Sridhar, on POC.  No acute events overnight.

## 2017-04-06 PROBLEM — E87.1 HYPONATREMIA: Status: ACTIVE | Noted: 2017-01-01

## 2017-04-06 PROBLEM — I44.7 LBBB (LEFT BUNDLE BRANCH BLOCK): Status: ACTIVE | Noted: 2017-01-01

## 2017-04-06 PROBLEM — T50.8X5A CONTRAST DYE INDUCED NEPHROPATHY: Status: ACTIVE | Noted: 2017-01-01

## 2017-04-06 PROBLEM — R00.0 WIDE-COMPLEX TACHYCARDIA: Status: ACTIVE | Noted: 2017-01-01

## 2017-04-06 PROBLEM — N14.11 CONTRAST DYE INDUCED NEPHROPATHY: Status: ACTIVE | Noted: 2017-01-01

## 2017-04-06 PROBLEM — N17.0 ATN (ACUTE TUBULAR NECROSIS): Status: ACTIVE | Noted: 2017-01-01

## 2017-04-06 PROBLEM — N17.9 AKI (ACUTE KIDNEY INJURY): Status: ACTIVE | Noted: 2017-01-01

## 2017-04-06 NOTE — PROGRESS NOTES
Informed MD Yusuf pt K 3.9, lasix drip infusing. New orders received 20 meq PO K. Pt with INR 4.6, MD states will defer to morning team. RN verbalizes understanding.

## 2017-04-06 NOTE — PT/OT/SLP EVAL
Physical Therapy  Evaluation    Maty Epps   MRN: [id number]     PT Received On: 17  PT Start Time: 0940     PT Stop Time: 1000    PT Total Time (min): 20 min       Billable Minutes:  Evaluation 15    Diagnosis: <principal problem not specified>      Past Medical History:   Diagnosis Date    Atrial flutter     CHF (congestive heart failure)     Chronic kidney disease (CKD), stage III (moderate)     Coronary artery disease     Hypertension     S/P coronary artery bypass graft x 2 1999    Smoker       History reviewed. No pertinent surgical history.    General Precautions: Standard,  (fall)               Patient History:  Lives With: other (see comments)  Living Arrangements: house  Equipment Currently Used at Home: none    Previous Level of Function:  Ambulation Skills: independent  Transfer Skills: independent    Subjective:  Communicated with RN prior to session.      Pain Ratin/10                    Objective:   Patient found with:  (Bp cuff, tele, IV, Blount)     Cognitive Exam:  Oriented to: Person, Place, Time and Situation    Follows Commands/attention: Follows multistep  commands  Communication: clear/fluent  Safety awareness/insight to disability: impaired      Functional Mobility:  Bed Mobility:       Transfers:  Sit <> Stand Assistance: Contact Guard Assistance  Sit <> Stand Assistive Device:  (HHA)    Gait:   Gait Distance:  (150 feet)  Assistance 1: Minimum assistance  Gait Assistive Device: Hand held assist  Gait Deviation(s): decreased annette, increased time in double stance  Portable monitor in tow.  Pt very unsteady and demo 3 major LOBs.    Balance:   Static Sit: FAIR-: Maintains without assist but inconsistent   Dynamic Sit: POOR: N/A  Static Stand: POOR+: Needs MINIMAL assist to maintain  Dynamic stand: POOR: N/A        AM-PAC 6 CLICK MOBILITY  How much help from another person does this patient currently need?   1 = Unable, Total/Dependent Assistance  2 = A lot,  Maximum/Moderate Assistance  3 = A little, Minimum/Contact Guard/Supervision  4 = None, Modified [place]/Independent    Turning over in bed (including adjusting bedclothes, sheets and blankets)?: 3  Sitting down on and standing up from a chair with arms (e.g., wheelchair, bedside commode, etc.): 3  Moving from lying on back to sitting on the side of the bed?: 3  Moving to and from a bed to a chair (including a wheelchair)?: 3  Need to walk in hospital room?: 3  Climbing 3-5 steps with a railing?: 2  Total Score: 17     AM-PAC Raw Score CMS G-Code Modifier Level of Impairment Assistance   6 % Total / Unable   7 - 9 CM 80 - 100% Maximal Assist   10 - 14 CL 60 - 80% Moderate Assist   15 - 19 CK 40 - 60% Moderate Assist   20 - 22 CJ 20 - 40% Minimal Assist   23 CI 1-20% SBA / CGA   24 CH 0% Independent/ Mod I     Patient left up in chair with all lines intact and call button in reach.    Assessment:   Maty Epps is a 77 y.o. female with a medical diagnosis of <principal problem not specified> and presents with decreased functional independence.    Rehab identified problem list/impairments: Rehab identified problem list/impairments: weakness, impaired functional mobilty, impaired endurance, impaired self care skills, impaired balance    Rehab potential is good.    Activity tolerance: Good    Discharge recommendations: Discharge Facility/Level Of Care Needs: home health PT     Barriers to discharge: Barriers to Discharge: Decreased caregiver support      GOALS:   Physical Therapy Goals        Problem: Physical Therapy Goal    Goal Priority Disciplines Outcome Goal Variances Interventions   Physical Therapy Goal     PT/OT, PT Ongoing (interventions implemented as appropriate)     Description:  Goals to be met by:      Patient will increase functional independence with mobility by performin. Supine to sit with Stand-by Assistance  2. Sit to supine with Stand-by Assistance  3. Sit to stand  transfer with Stand-by Assistance  4. Gait  x 200 feet with SBA using Rolling Walker.                 PLAN:    Patient to be seen 5 x/week to address the above listed problems via therapeutic exercises, therapeutic activities, gait training          Moriah Espinoza, PT  04/06/2017

## 2017-04-06 NOTE — PROGRESS NOTES
Progress Note  Cardiology    Admit Date: 4/3/2017   LOS: 3 days   4/6/2017    SUBJECTIVE:     Patient seen and examined. Denies chest pain or shortness of breath. Pt denies any other complaints.     Scheduled Meds:   amiodarone  400 mg Oral BID    aspirin  81 mg Oral Daily    mexiletine  200 mg Oral Q8H     Continuous Infusions:   furosemide (LASIX) 5 mg/mL infusion (non-titrating) 20 mg/hr (04/06/17 1200)     PRN Meds:acetaminophen, alprazolam, ceFAZolin 2 g/50mL Dextrose IVPB    Review of patient's allergies indicates:  No Known Allergies    OBJECTIVE:     Vital Signs (Most Recent)  Temp: 97.9 °F (36.6 °C) (04/06/17 1100)  Pulse: 60 (04/06/17 0500)  Resp: (!) 45 (04/06/17 0500)  BP: (!) 143/65 (04/06/17 0500)  SpO2: 97 % (04/06/17 0500)    Vital Signs Range (Last 24H):  Temp:  [97.4 °F (36.3 °C)-97.9 °F (36.6 °C)]   Pulse:  [54-68]   Resp:  [14-45]   BP: (112-149)/(58-67)   SpO2:  [92 %-99 %]     I & O (Last 24H):  Intake/Output Summary (Last 24 hours) at 04/06/17 1302  Last data filed at 04/06/17 1200   Gross per 24 hour   Intake            851.6 ml   Output             2620 ml   Net          -1768.4 ml       Tele: no events    Physical Exam:   Constitutional: NAD, conversant  HEENT: Sclera anicteric, PERRLA, EOMI  Neck: JVD ~ 10 cm, no carotid bruits  CV: RRR, no murmur  Pulm: mild Bibasilar crackles  GI: Abdomen soft, NTND, +BS  Extremities: Trace LE edema, warm and well perfused, 2+ distal pulses  Skin: No ecchymosis, erythema, or ulcers  Psych: AOx3, appropriate affect    LABS  CBC with Diff:     Recent Labs  Lab 04/04/17  0410 04/05/17  0232 04/06/17  0322   WBC 20.96* 21.47* 24.80*   HGB 11.3* 13.1 13.0   HCT 33.7* 35.9* 36.9*    238 222   LYMPH 6.2*  1.3 6.0*  CANCELED 4.2*  1.1   MONO 6.0  1.3* 5.0  CANCELED 6.6  1.6*   EOSINOPHIL 0.0 0.0 0.0       COAG:    Recent Labs  Lab 04/03/17  0057 04/03/17  0729 04/03/17  1417 04/04/17  0410 04/05/17  0232 04/06/17  0322   APTT >150.0* 26.6 25.0   --   --   --    INR 2.1*  --   --  2.0* 4.3* 4.6*       CMP:   Recent Labs  Lab 04/04/17  0410  04/05/17  0232 04/05/17  1338 04/06/17  0322   *  < > 103 114* 103   CALCIUM 7.8*  < > 8.4* 8.5* 8.7   ALBUMIN 2.7*  --  2.7*  --  2.7*   PROT 6.0  --  6.2  --  6.0   *  < > 132* 130* 134*   K 4.3  < > 4.0 3.7 3.9   CO2 17*  < > 16* 16* 21*   CL 90*  < > 96 96 98   *  < > 119* 115* 119*   CREATININE 4.0*  < > 3.5* 3.2* 3.1*   ALKPHOS 82  --  98  --  106   *  --  339*  --  400*   AST 85*  --  161*  --  149*   BILITOT 0.5  --  0.6  --  0.6   MG 1.9  --  2.1  --  2.0   PHOS 8.1*  --  6.6*  --  5.5*   < > = values in this interval not displayed.  Estimated Creatinine Clearance: 12.2 mL/min (based on Cr of 3.1).    .  Recent Labs  Lab 04/03/17  0057   BNP 2705*         Diagnostic Results:  4/3/17 echo   CONCLUSIONS     1 - Eccentric LVH with severely depressed left ventricular systolic function (EF 20-25%). There is layer apical thrombus..     2 - Severe left atrial enlargement.     3 - Left ventricular diastolic dysfunction with increased LAP.     4 - Mild aortic regurgitation.     5 - Mild to moderate mitral regurgitation.     6 - Mild tricuspid regurgitation.     7 - The estimated PA systolic pressure is greater than 40 mmHg.     8 - Bilateral pleural effusion.     Patient Active Problem List   Diagnosis    VT (ventricular tachycardia)    S/P CABG (coronary artery bypass graft)    Chronic systolic heart failure    S/P ICD (internal cardiac defibrillator) procedure    Cardiac resynchronization therapy defibrillator (CRT-D) in place    Benign hypertensive heart and CKD, stage 3 (GFR 30-59), w CHF    LV (left ventricular) mural thrombus without MI    Ischemic cardiomyopathy    Wide-complex tachycardia    LBBB (left bundle branch block)    SHANNAN (acute kidney injury)    ATN (acute tubular necrosis)    Contrast dye induced nephropathy    Hyponatremia       ASSESSMENT / PLAN:     76 yo  "female with PMH of MI(remote), HTN, CABG x 2(1999), CKD 3-4, s/p ICD 3/23/17, heavy tobacco use who is transferred to [facility] [place] [facility] for ventricular tachycardia management.    VT  - No further episodes since admission  - Amiodarone 400 mg PO BID, Mexetil 200 mg TID  - Patient seen by EP, appreciate recs  - Currently without any VT or ectopy  - Underlying rhythm from OSH likely VT, device without atrial lead, set to VVI @ 30 bpm by EP, may require upgrade to CRT-D (,  LBBB), not candidate for endocardial VT RFA due to layered thrombus  - no CRT-D until INR normalizes      CAD s/p CABG and remote MI, s/p ICD 3/23/17  - Per patient and family LHC at OSH was "normal", obtain outside cath films as report without anatomy -- this is still pending and I spoke with nursing to try to acquire this ASAP  - AST/ALT up - Worsened overnight, holding statin at this time  - Continue ASA  - Decompensated so holding BB given unknown medication history      Leukocytosis  - etiology unclear, patient is normotensive and afebrile  - may be acute phase from VT and shock vs infection  - urine and blood cultures NGTD, CXR shows CHF  - checking cdiff  - checking hep panel  - will not initiate ABX at this time, continue to monitor      ICM  - Patient is warm on exam with good distal pulses, JVD and LE edema improved, labs significant for elevated transaminases   - decreased Lasix gtt to 10 given good output and perssitently net- with improving physical exam  - Patient net -1.7 L over 24 hour period      SHANNAN with history of CKD 3  - Improved from previous, 3.1  - Baseline seems to be 2.0, will continue to monitor, continue diuresis      Hyponatremia  - Suspect 2/2 to heart failure given volume status, albumin on the low side and patient appears deconditioned so solute state may also be contributing   - Corrected from 130 to 134 overnight continue lasix      LV Apical Layer Thrombus  - patient was transferred on heparin drip  - INR " supratherapeutic at 4.6 today  - last coumadin dose 2 days ago    Elevated LFTs, INR  - though statin/coumadin most likely etiology, will checkl iver u/s and hep panel  - follow results    Kavon Alvarez MD  [id number]

## 2017-04-06 NOTE — PLAN OF CARE
Problem: Physical Therapy Goal  Goal: Physical Therapy Goal  Outcome: Ongoing (interventions implemented as appropriate)  PT juana completed.  Moriah Espinoza, PT  4/6/2017

## 2017-04-06 NOTE — PT/OT/SLP EVAL
Occupational Therapy  Evaluation    Maty Epps   MRN: [id number]   Admitting Diagnosis: <principal problem not specified>    OT Date of Treatment: 17   OT Start Time: 1027  OT Stop Time: 1046  OT Total Time (min): 19 min    Billable Minutes:  Evaluation 10  Therapeutic Activity 8    Diagnosis: Vtach   T/f'd from OSH for management of V-tach    Past Medical History:   Diagnosis Date    Atrial flutter     CHF (congestive heart failure)     Chronic kidney disease (CKD), stage III (moderate)     Coronary artery disease     Hypertension     S/P coronary artery bypass graft x 2 1999    Smoker       History reviewed. No pertinent surgical history.    Referring physician: Jhoana  Date referred to OT: 17    General Precautions: Standard, fall  Orthopedic Precautions:    Braces:      Do you have any cultural, spiritual, Hoahaoism conflicts, given your current situation?: None reported     Patient History:  Living Environment  Lives With: alone  Living Arrangements: house  Home Accessibility: stairs to enter home  Number of Stairs to Enter Home: 11  Stair Railings at Home: outside, present at both sides  Transportation Available: family or friend will provide  Living Environment Comment: Pt's friend will be able to assist her if needed  Equipment Currently Used at Home: none    Prior level of function:   Bed Mobility/Transfers: independent  Grooming: independent  Bathing: independent  Upper Body Dressing: independent  Lower Body Dressing: independent  Toileting: independent  Driving License: Yes  Leisure and Hobbies: going to [employer]     Dominant hand: right    Subjective:  Communicated with RN prior to session.    Chief Complaint: none  Patient/Family stated goals: to go home    Pain Ratin/10              Pain Rating Post-Intervention: 0/10    Objective:  Patient found with: blood pressure cuff, mckenzie catheter, peripheral IV, telemetry, pulse ox (continuous)    Cognitive Exam:  Oriented to:  Person, Place, Time and Situation  Follows Commands/attention: Follows two-step commands  Communication: clear/fluent  Memory:  No Deficits noted  Safety awareness/insight to disability: intact  Coping skills/emotional control: Appropriate to situation    Visual/perceptual:  Intact    Physical Exam:  Postural examination/scapula alignment: No postural abnormalities identified  Skin integrity: Visible skin intact  Edema: None noted     Sensation:   Intact    Upper Extremity Range of Motion:  Right Upper Extremity: WFL  Left Upper Extremity: WFL    Upper Extremity Strength:  Right Upper Extremity: WFL  Left Upper Extremity: WFL   Strength: Good    Fine motor coordination:   Intact    Gross motor coordination: WFL    Functional Mobility:  Bed Mobility:  Rolling/Turning to Left:  (Pt UIC upon OT arrival)    Transfers:  Sit <> Stand Assistance: Contact Guard Assistance  Sit <> Stand Assistive Device: No Assistive Device  Bed <> Chair Technique: Stand Pivot  Bed <> Chair Transfer Assistance: Contact Guard Assistance  Bed <> Chair Assistive Device: No Assistive Device    Functional Ambulation: Minimal A d/t frequent LOB    Activities of Daily Living:  Feeding Level of Assistance: Modified independent  Feeding adaptive equipment:   UE Dressing Level of Assistance: Contact guard  UE adaptive equipment:   LE Dressing Level of Assistance: Contact guard  LE adaptive equipment:   Grooming Position: Standing  Grooming Level of Assistance: Contact guard assistance                AM-PAC 6 CLICK ADL  How much help from another person does this patient currently need?  1 = Unable, Total/Dependent Assistance  2 = A lot, Maximum/Moderate Assistance  3 = A little, Minimum/Contact Guard/Supervision  4 = None, Modified [place]/Independent    Putting on and taking off regular lower body clothing? : 3  Bathing (including washing, rinsing, drying)?: 3  Toileting, which includes using toilet, bedpan, or urinal? : 3  Putting on and  "taking off regular upper body clothing?: 3  Taking care of personal grooming such as brushing teeth?: 3  Eating meals?: 4  Total Score: 19    AM-PAC Raw Score CMS "G-Code Modifier Level of Impairment Assistance   6 % Total / Unable   7 - 9 CM 80 - 100% Maximal Assist   10 - 14 CL 60 - 80% Moderate Assist   15 - 19 CK 40 - 60% Moderate Assist   20 - 22 CJ 20 - 40% Minimal Assist   23 CI 1-20% SBA / CGA   24 CH 0% Independent/ Mod I       Patient left up in chair with all lines intact, call button in reach and RN notified    Assessment:  Maty Epps is a 77 y.o. female with a medical diagnosis of V-tach and presents with weakness and decreased balance likely d/t immobility.    Rehab identified problem list/impairments: Rehab identified problem list/impairments: weakness, impaired endurance, impaired self care skills, impaired functional mobilty, impaired balance, gait instability, impaired cardiopulmonary response to activity    Rehab potential is good.    Activity tolerance: Good    Discharge recommendations: Discharge Facility/Level Of Care Needs: home with home health     Barriers to discharge:      Equipment recommendations: none     GOALS:   Occupational Therapy Goals        Problem: Occupational Therapy Goal    Goal Priority Disciplines Outcome Interventions   Occupational Therapy Goal     OT, PT/OT Ongoing (interventions implemented as appropriate)    Description:  Goals to be met by: 4/11/17     Patient will increase functional independence with ADLs by performing:    UE Dressing with Supervision.  LE Dressing with Supervision.  Grooming while standing at sink with Supervision.  Toileting from toilet with Supervision for hygiene and clothing management.   Toilet transfer to toilet with Supervision.                PLAN:  Patient to be seen 4 x/week to address the above listed problems via self-care/home management, therapeutic activities, therapeutic exercises  Plan of Care expires: 05/06/17  Plan of " Care reviewed with: patient         Lucille MIGUEL A Arana  04/06/2017

## 2017-04-06 NOTE — NURSING
RN called MD, Dr. Alvarez,(at request of Dr. Sumner) to report nephrology recommendations to d/c magaly and d/c lasix drip. MD stated he would review note and enter orders. No orders received to d/c magaly or lasix.

## 2017-04-06 NOTE — PLAN OF CARE
Problem: Patient Care Overview  Goal: Plan of Care Review  Outcome: Ongoing (interventions implemented as appropriate)  Pt AAOx4, pt states anxious about today's procedure and hospitalization, emotional support provided. hibiclens bath given. Pt possibly going to cath lab today for a procedure. No pressure ulcer. 1 BM during shift. Lasix drip infusing. Blount care performed, u/o >  cc/hr. No acute events throughout shift, VS and assessment per flow sheet, patient progressing towards goals as tolerated, plan of care reviewed with Maty Epps, all concerns addressed, will continue to monitor.

## 2017-04-06 NOTE — PLAN OF CARE
Problem: Occupational Therapy Goal  Goal: Occupational Therapy Goal  Goals to be met by: 4/11/17     Patient will increase functional independence with ADLs by performing:    UE Dressing with Supervision.  LE Dressing with Supervision.  Grooming while standing at sink with Supervision.  Toileting from toilet with Supervision for hygiene and clothing management.   Toilet transfer to toilet with Supervision.  Outcome: Ongoing (interventions implemented as appropriate)  OT eval completed, and above goals established.   MIGUEL A Quesada  4/6/2017

## 2017-04-06 NOTE — CONSULTS
Maty Epps is a 77 y.o. female who our service has been consulted to evaluate and give opinion.     History of Present Illness: PMH of ischemic cardiomyopathy (EF 20%), MI, CABG x 2(1999), longstanding HTN, HLD, COPD, KELSY, CKD 3-4, s/p ICD 3/23/17, heavy tobacco use  transferred to [facility] from [facility] for ventricular tachycardia management. In [place] she had a sustained symptomatic VT with prolonged hypotension (systolic BPs in the 70's to 80's) on 3/30/17, was cardioverted, non ST elevation MI with angiogram. She has a hx of CKD 4 with a baseline creatinine around 2.0 mg/dl (1.9 in 3/3/17). She also has a hx of KELSY in the past (no intervention). After her hypotensive episode she was found to be in SHANNAN and her creatinine increased from 2.4 mg/dlwith to 3.6 mg/dl.  At this time the patient feels good, no more SOB. She is on a Lasix drip and has a Blount in place. She has been consistently negative in her urine volume.        Past Medical History:   Diagnosis Date    Atrial flutter     CHF (congestive heart failure)     Chronic kidney disease (CKD), stage III (moderate)     Coronary artery disease     Hypertension     S/P coronary artery bypass graft x 2 01/01/1999    Smoker        History reviewed. No pertinent surgical history.    History reviewed. No pertinent family history.    Review of patient's allergies indicates:  No Known Allergies    Current Facility-Administered Medications   Medication Dose Route Frequency Provider Last Rate Last Dose    acetaminophen tablet 325 mg  325 mg Oral Q6H PRN Hernando Yusuf MD   325 mg at 04/04/17 0501    alprazolam tablet 0.5 mg  0.5 mg Oral Nightly PRN Hernando Yusuf MD   0.5 mg at 04/04/17 2251    amiodarone tablet 400 mg  400 mg Oral BID Dre Guzman MD   400 mg at 04/06/17 0826    aspirin EC tablet 81 mg  81 mg Oral Daily Dre Guzman MD   81 mg at 04/06/17 0826    cefazolin (ANCEF) 2 gram in dextrose 5% 50 mL IVPB (premix)   2 g Intravenous On Call Procedure Duke Mcgee MD        furosemide (LASIX) 500 mg in sodium chloride 0.9% 100 mL infusion  20 mg/hr Intravenous Continuous Kameron Ely MD 4 mL/hr at 04/06/17 1000 20 mg/hr at 04/06/17 1000    mexiletine capsule 200 mg  200 mg Oral Q8H Kameron Ely MD   200 mg at 04/06/17 0533       Review of Systems:    Constitutional: no chills, no fevers  HEENT: no issues   Resp: no shortness of breath, no cough but dyspnea on exertion   Cardiac: no chest pain, no palpitations  Abd: no nausea or vomiting. No abdominal pain  Neuro: no headache, no dizziness.   Psych: no depression, no agitation  Skin: no rashes, itching, lesions  Muscular:  no joint pains, cramps at night in her legs        Physical Exam:    Temp:  [97.4 °F (36.3 °C)-97.9 °F (36.6 °C)] 97.9 °F (36.6 °C)  Pulse:  [54-68] 60  Resp:  [14-45] 45  SpO2:  [92 %-99 %] 97 %  BP: (112-149)/(58-67) 143/65      Intake/Output Summary (Last 24 hours) at 04/06/17 1156  Last data filed at 04/06/17 1000   Gross per 24 hour   Intake            843.6 ml   Output             2645 ml   Net          -1801.4 ml           Gen: WDWN female in no apparent distress  Psych: Normal mood and affect  Skin: No rashes or ulcers  Eyes: Normal conjunctiva and lids, PERRLA  ENT: Normal hearing with no oropharyngeal lesions  Neck: No JVD  Chest:  nly very discreteales at the bases, rhonchi, wheezing with normal effort  CV: Regular with a systolic murmur, S 3  Abd: Soft, nontender, no distension, positive bowel sounds  Ext: No cyanosis, clubbing or edema      Recent Labs  Lab 04/04/17  0410  04/05/17  0232 04/05/17  1338 04/06/17  0322   *  < > 132* 130* 134*   K 4.3  < > 4.0 3.7 3.9   CL 90*  < > 96 96 98   CO2 17*  < > 16* 16* 21*   *  < > 119* 115* 119*   CREATININE 4.0*  < > 3.5* 3.2* 3.1*   CALCIUM 7.8*  < > 8.4* 8.5* 8.7   PHOS 8.1*  --  6.6*  --  5.5*   < > = values in this interval not displayed.        Recent Labs  Lab  04/04/17  0410 04/05/17  0232 04/06/17  0322   WBC 20.96* 21.47* 24.80*   HGB 11.3* 13.1 13.0   HCT 33.7* 35.9* 36.9*    238 222             Impression/Plan:78 yo female with SHANNAN on CKD with severe ischemic cardiomyopathy and s/p VT with hypotension, NSTEMI and angiogram.   1. SHANNAN on CKD most likely multifactorial: ATN due to hypotensive episode and also contrast induced nephropathy.   Creatinine improving with good urine ouput on furosemide drip (20mg/hr).  - would stop furosemide drip and change to iv/po dosage at 80mg bid (home dose was 40mg)  - d/c mckenzie cath (patient can go to bathroom and measure u/o)  - repeat UA and urine culture (wbc is increasing)      2. Elevated liver enzymes: post cardiogenic shock? Stable at this point compared to admit.      3. Hyponatremia: likely secondary to CHF: improving - moderate fluid restiction

## 2017-04-07 PROBLEM — I50.9 ACUTE DECOMPENSATED HEART FAILURE: Status: ACTIVE | Noted: 2017-01-01

## 2017-04-07 NOTE — PLAN OF CARE
Problem: Physical Therapy Goal  Goal: Physical Therapy Goal  Goals to be met by:      Patient will increase functional independence with mobility by performin. Supine to sit with Stand-by Assistance  2. Sit to supine with Stand-by Assistance  3. Sit to stand transfer with Stand-by Assistance  4. Gait x 200 feet with SBA using Rolling Walker.    Outcome: Ongoing (interventions implemented as appropriate)  Current goals remain appropriate. Will continue to progress as tolerated.

## 2017-04-07 NOTE — ASSESSMENT & PLAN NOTE
- To follow up on [facility] report.   - Continue ASA, BB and Statin on hold for now, for acute decompensation and elevated liver enzymes.

## 2017-04-07 NOTE — ASSESSMENT & PLAN NOTE
Current INR is 5.5;Was transferred from OSH on heparin drip.   - Continue to monitor, follow up echo before discharge.

## 2017-04-07 NOTE — SUBJECTIVE & OBJECTIVE
Interval History: As in hospital course.     Review of Systems   Constitution: Positive for weakness. Negative for chills, decreased appetite, diaphoresis, fever, malaise/fatigue, night sweats, weight gain and weight loss.   HENT: Negative.    Eyes: Negative.    Cardiovascular: Negative.    Respiratory: Negative.    Endocrine: Negative.    Hematologic/Lymphatic: Negative.    Skin: Negative.    Musculoskeletal: Negative.    Gastrointestinal: Negative.    Genitourinary: Negative.    Psychiatric/Behavioral: Negative for altered mental status, depression, hallucinations, hypervigilance, memory loss, substance abuse, suicidal ideas and thoughts of violence. The patient is nervous/anxious. The patient does not have insomnia.    Allergic/Immunologic: Negative.      Objective:     Vital Signs (Most Recent):  Temp: 98.1 °F (36.7 °C) (04/07/17 0700)  Pulse: 60 (04/07/17 0700)  Resp: (!) 50 (04/07/17 0700)  BP: (!) 117/58 (04/07/17 0700)  SpO2: 96 % (04/07/17 0700) Vital Signs (24h Range):  Temp:  [97.5 °F (36.4 °C)-98.7 °F (37.1 °C)] 98.1 °F (36.7 °C)  Pulse:  [60-66] 60  Resp:  [14-50] 50  SpO2:  [96 %-97 %] 96 %  BP: (109-137)/(55-60) 117/58     Weight: 55.1 kg (121 lb 7.6 oz)  Body mass index is 22.22 kg/(m^2).     SpO2: 96 %  O2 Device (Oxygen Therapy): room air      Intake/Output Summary (Last 24 hours) at 04/07/17 0742  Last data filed at 04/07/17 0700   Gross per 24 hour   Intake              688 ml   Output             2585 ml   Net            -1897 ml       Lines/Drains/Airways     Drain                 Urethral Catheter 04/03/17 0230 Latex 4 days          Peripheral Intravenous Line                 Peripheral IV - Single Lumen 04/05/17 2320 Right Wrist 1 day         Peripheral IV - Single Lumen 04/06/17 2215 less than 1 day                Physical Exam   Constitutional: She appears well-developed and well-nourished.   HENT:   Head: Normocephalic and atraumatic.   Eyes: Conjunctivae are normal. Pupils are equal,  round, and reactive to light.   Neck: Normal range of motion. Neck supple.   Cardiovascular: Normal rate and regular rhythm.    Pulmonary/Chest: Effort normal and breath sounds normal.   Abdominal: Soft. Bowel sounds are normal.   Musculoskeletal: Normal range of motion.   Neurological: She is alert.   Skin: Skin is warm and dry.   Psychiatric: She has a normal mood and affect.   Nursing note and vitals reviewed.      Significant Labs:   CMP   Recent Labs  Lab 04/05/17  1338 04/06/17  0322 04/07/17  0259   * 134* 135*   K 3.7 3.9 3.8   CL 96 98 97   CO2 16* 21* 23   * 103 111*   * 119* 112*   CREATININE 3.2* 3.1* 3.0*   CALCIUM 8.5* 8.7 8.9   PROT  --  6.0 6.2   ALBUMIN  --  2.7* 2.7*   BILITOT  --  0.6 0.6   ALKPHOS  --  106 101   AST  --  149* 129*   ALT  --  400* 359*   ANIONGAP 18* 15 15   ESTGFRAFRICA 15.5* 16.1* 16.8*   EGFRNONAA 13.4* 14.0* 14.5*   , CBC   Recent Labs  Lab 04/06/17  0322 04/07/17  0259   WBC 24.80* 24.05*   HGB 13.0 12.6   HCT 36.9* 37.5    226    and INR   Recent Labs  Lab 04/06/17  0322 04/07/17  0259   INR 4.6* 5.5*       Significant Imaging: X-Ray: CXR: X-Ray Chest 1 View (CXR):   Results for orders placed or performed during the hospital encounter of 04/03/17   X-Ray Chest 1 View    Narrative    Portable AP view of the chest was obtained.  Comparison -- none. A pacemaker is present on the left with single electrode to the right ventricle. Postoperative changes are present likely CABG. The heart is mildly enlarged. Mediastinum shows aortic atherosclerosis. The lungs are expanded with increased pulmonary vascularity and interstitial markings. Minimal air space consolidation is present in the bases. Small pleural effusions are present on both sides. Skeletal structures show sternal wires .    Impression     Status post CABG. Mild CHF.      Electronically signed by: ROBIN PALACIOS MD  Date:     04/03/17  Time:    08:12

## 2017-04-07 NOTE — PT/OT/SLP PROGRESS
"Physical Therapy  Treatment    Maty Epps   MRN: [id number]   Admitting Diagnosis: Ventricular tachycardia    PT Received On: 17  PT Start Time: 1023     PT Stop Time: 1048    PT Total Time (min): 25 min       Billable Minutes:  Gait [id number] and Therapeutic Exercise 10    Treatment Type: Treatment  PT/PTA: PT             General Precautions: Standard, fall  Orthopedic Precautions: N/A   Braces: N/A         Subjective:  Communicated with RN prior to session.  Pt willing to participate with PT. Reports feeling "shaky" and states, "I've never been this weak in my life". Pt requests to amb within room. No family present.     Pain Ratin/10     Objective:   Patient found with: blood pressure cuff, peripheral IV, pulse ox (continuous), telemetry    Functional Mobility:  Bed Mobility:   Supine to Sit:  (Not tested - pt up in chair)    Transfers:  Sit <> Stand Assistance: Contact Guard Assistance  Sit <> Stand Assistive Device: No Assistive Device    Gait:   Gait Distance: ~30 ft x 2 trials, seated rest break between bouts  Assistance 1: Minimum assistance  Gait Assistive Device: No device, Hand held assist  Gait Pattern: 2-point gait  Gait Deviation(s): decreased annette, increased time in double stance, decreased step length, increased stride width, decreased weight-shifting ability (Pt demonstrating mild gait instability, no overt LOB noted. Pt declined to attempt amb in hallway, performed amb within room. Vitals stable throughout. )      Balance:   Static Sit: FAIR+: Able to take MINIMAL challenges from all directions  Dynamic Sit: FAIR+: Maintains balance through MINIMAL excursions of active trunk motion  Static Stand: POOR+: Needs MINIMAL assist to maintain  Dynamic stand: POOR: N/A     Therapeutic Activities and Exercises:  Sitting LE therex consisting of: hip flex, LAQs, resisted hip add, heel/toe raises x 10 reps.   Performed standing marching and minisquats x 10 reps, min A for standing balance.   Pt " amb ~30 ft x 2 trials with HHA, min A, seated rest break between.   Sit <> stand transfers performed x 3 trials during PT session. Vitals stable throughout.     AM-PAC 6 CLICK MOBILITY  How much help from another person does this patient currently need?   1 = Unable, Total/Dependent Assistance  2 = A lot, Maximum/Moderate Assistance  3 = A little, Minimum/Contact Guard/Supervision  4 = None, Modified [place]/Independent    Turning over in bed (including adjusting bedclothes, sheets and blankets)?: 3  Sitting down on and standing up from a chair with arms (e.g., wheelchair, bedside commode, etc.): 3  Moving from lying on back to sitting on the side of the bed?: 3  Moving to and from a bed to a chair (including a wheelchair)?: 3  Need to walk in hospital room?: 3  Climbing 3-5 steps with a railing?: 2  Total Score: 17    AM-PAC Raw Score CMS G-Code Modifier Level of Impairment Assistance   6 % Total / Unable   7 - 9 CM 80 - 100% Maximal Assist   10 - 14 CL 60 - 80% Moderate Assist   15 - 19 CK 40 - 60% Moderate Assist   20 - 22 CJ 20 - 40% Minimal Assist   23 CI 1-20% SBA / CGA   24 CH 0% Independent/ Mod I     Patient left up in chair with all lines intact, call button in reach and RN notified.    Assessment:  Maty Epps is a 77 y.o. female with a medical diagnosis of Ventricular tachycardia and presents deconditioned with generalized weakness and decreased activity tolerance and generalized weakness, affecting functional mobility, balance, gait and self care. Pt would benefit from use of AD for improved gait stability. Would benefit from continued PT services to address deficits in function. Will continue to progress as tolerated.    Rehab identified problem list/impairments: Rehab identified problem list/impairments: weakness, impaired endurance, impaired self care skills, impaired functional mobilty, gait instability, impaired balance, impaired cardiopulmonary response to activity    Rehab  potential is good.    Activity tolerance: Fair+    Discharge recommendations: Discharge Facility/Level Of Care Needs: home health PT     Barriers to discharge: Barriers to Discharge: Decreased caregiver support    Equipment recommendations: Equipment Needed After Discharge:  (TBD)     GOALS:   Physical Therapy Goals        Problem: Physical Therapy Goal    Goal Priority Disciplines Outcome Goal Variances Interventions   Physical Therapy Goal     PT/OT, PT Ongoing (interventions implemented as appropriate)     Description:  Goals to be met by:      Patient will increase functional independence with mobility by performin. Supine to sit with Stand-by Assistance  2. Sit to supine with Stand-by Assistance  3. Sit to stand transfer with Stand-by Assistance  4. Gait  x 200 feet with SBA using Rolling Walker.                 PLAN:    Patient to be seen 5 x/week  to address the above listed problems via gait training, therapeutic activities, therapeutic exercises  Plan of Care expires: 17  Plan of Care reviewed with: patient         Kerrie A Maria D, PT  2017

## 2017-04-07 NOTE — PROGRESS NOTES
[facility]  Cardiology  Progress Note    Patient Name: Maty Epps  MRN: [id number]  Admission Date: 4/3/2017  Hospital Length of Stay: 4 days  Code Status: Full Code   Attending Physician: Fernanda Hopkins MD   Primary Care Physician: Primary Doctor No  Expected Discharge Date:   Principal Problem:<principal problem not specified>    Subjective:     Hospital Course:   4/7/2017 -  Abd. Cholelithiasis without -cyctitis.     No overnigth issues. No more VTs.   Complained of dry mouth and feelign thirsty this AM  Labs with trending down liver enzymes and creatinine.       Interval History: As in hospital course.     Review of Systems   Constitution: Positive for weakness. Negative for chills, decreased appetite, diaphoresis, fever, malaise/fatigue, night sweats, weight gain and weight loss.   HENT: Negative.    Eyes: Negative.    Cardiovascular: Negative.    Respiratory: Negative.    Endocrine: Negative.    Hematologic/Lymphatic: Negative.    Skin: Negative.    Musculoskeletal: Negative.    Gastrointestinal: Negative.    Genitourinary: Negative.    Psychiatric/Behavioral: Negative for altered mental status, depression, hallucinations, hypervigilance, memory loss, substance abuse, suicidal ideas and thoughts of violence. The patient is nervous/anxious. The patient does not have insomnia.    Allergic/Immunologic: Negative.      Objective:     Vital Signs (Most Recent):  Temp: 98.1 °F (36.7 °C) (04/07/17 0700)  Pulse: 60 (04/07/17 0700)  Resp: (!) 50 (04/07/17 0700)  BP: (!) 117/58 (04/07/17 0700)  SpO2: 96 % (04/07/17 0700) Vital Signs (24h Range):  Temp:  [97.5 °F (36.4 °C)-98.7 °F (37.1 °C)] 98.1 °F (36.7 °C)  Pulse:  [60-66] 60  Resp:  [14-50] 50  SpO2:  [96 %-97 %] 96 %  BP: (109-137)/(55-60) 117/58     Weight: 55.1 kg (121 lb 7.6 oz)  Body mass index is 22.22 kg/(m^2).     SpO2: 96 %  O2 Device (Oxygen Therapy): room air      Intake/Output Summary (Last 24 hours) at 04/07/17 0742  Last data  filed at 04/07/17 0700   Gross per 24 hour   Intake              688 ml   Output             2585 ml   Net            -1897 ml       Lines/Drains/Airways     Drain                 Urethral Catheter 04/03/17 0230 Latex 4 days          Peripheral Intravenous Line                 Peripheral IV - Single Lumen 04/05/17 2320 Right Wrist 1 day         Peripheral IV - Single Lumen 04/06/17 2215 less than 1 day                Physical Exam   Constitutional: She appears well-developed and well-nourished.   HENT:   Head: Normocephalic and atraumatic.   Eyes: Conjunctivae are normal. Pupils are equal, round, and reactive to light.   Neck: Normal range of motion. Neck supple.   Cardiovascular: Normal rate and regular rhythm.    Pulmonary/Chest: Effort normal and breath sounds normal.   Abdominal: Soft. Bowel sounds are normal.   Musculoskeletal: Normal range of motion.   Neurological: She is alert.   Skin: Skin is warm and dry.   Psychiatric: She has a normal mood and affect.   Nursing note and vitals reviewed.      Significant Labs:   CMP   Recent Labs  Lab 04/05/17  1338 04/06/17  0322 04/07/17  0259   * 134* 135*   K 3.7 3.9 3.8   CL 96 98 97   CO2 16* 21* 23   * 103 111*   * 119* 112*   CREATININE 3.2* 3.1* 3.0*   CALCIUM 8.5* 8.7 8.9   PROT  --  6.0 6.2   ALBUMIN  --  2.7* 2.7*   BILITOT  --  0.6 0.6   ALKPHOS  --  106 101   AST  --  149* 129*   ALT  --  400* 359*   ANIONGAP 18* 15 15   ESTGFRAFRICA 15.5* 16.1* 16.8*   EGFRNONAA 13.4* 14.0* 14.5*   , CBC   Recent Labs  Lab 04/06/17  0322 04/07/17  0259   WBC 24.80* 24.05*   HGB 13.0 12.6   HCT 36.9* 37.5    226    and INR   Recent Labs  Lab 04/06/17  0322 04/07/17  0259   INR 4.6* 5.5*       Significant Imaging: X-Ray: CXR: X-Ray Chest 1 View (CXR):   Results for orders placed or performed during the hospital encounter of 04/03/17   X-Ray Chest 1 View    Narrative    Portable AP view of the chest was obtained.  Comparison -- none. A pacemaker is  present on the left with single electrode to the right ventricle. Postoperative changes are present likely CABG. The heart is mildly enlarged. Mediastinum shows aortic atherosclerosis. The lungs are expanded with increased pulmonary vascularity and interstitial markings. Minimal air space consolidation is present in the bases. Small pleural effusions are present on both sides. Skeletal structures show sternal wires .    Impression     Status post CABG. Mild CHF.      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   - Volume status has improved with Lasix drip.   - Both liver enzymes and renal functions improving.   - Transition to intermittent Lasix as pt at less than dry weight.   - Electrolytes normal.   - Optimize medical therapy upon improvement and before discharge.     Ventricular tachycardia  No further episodes since admission  -  WCT at OSH with DC Shock from external device.   - Currently continue on Amiodarone 400 mg PO BID, Mexetil 200 mg TID  - Appreciate EP recs.   - Underlying rhythm from OSH likely SVT, therefore not shocked,  device without atrial lead, set to VVI @ 30 bpm by EP, will require upgrade to BiV ICD.  (,  LBBB), not candidate for endocardial VT RFA due to layered thrombus.   - INR still supratherapeutic; suspect congestive hepatopathy.     S/P CABG (coronary artery bypass graft)  - To follow up on OSH [facility] report.   - Continue ASA, BB and Statin on hold for now, for acute decompensation and elevated liver enzymes.     LV (left ventricular) mural thrombus without MI  Current INR is 5.5;Was transferred from OSH on heparin drip.   - Continue to monitor, follow up  echo before discharge.       SHANNAN (acute kidney injury)  - SHANNAN on CKD in the setting of VT related hypoperfusion leading to ATN vs Cardiorenal syndrome  - Creatinine trendign down.   - Will transition to intermittent Lasix from drip.       VTE Risk Mitigation         Ordered     Medium Risk of VTE  Once      04/03/17 0131          Hilario Puente MD  Cardiology  [facility]

## 2017-04-07 NOTE — PLAN OF CARE
Problem: Patient Care Overview  Goal: Plan of Care Review  Outcome: Ongoing (interventions implemented as appropriate)  No acute events overnight. VSS during shift. Pt. INR continues trend upward. Pt. NPO following US pending decision regarding AICD upgrade. Pt. Progressing towards goals of care as tolerated. Plan of care is for eventual AICD upgrade when INR is lowered. Plan of care reviewed and discussed with pt. All questions and concerns were addressed. Pt. Verbalized understanding.

## 2017-04-07 NOTE — ASSESSMENT & PLAN NOTE
- In the setting of Ischemic CMP.   - Volume status has improved with Lasix drip.   - Both liver enzymes and renal functions improving.   - Transition to intermittent Lasix as pt at less than dry weight.   - Electrolytes normal.   - Optimize medical therapy upon improvement and before discharge.

## 2017-04-07 NOTE — ASSESSMENT & PLAN NOTE
No further episodes since admission  -  WCT at OSH with DC Shock from external device.   - Currently continue on Amiodarone 400 mg PO BID, Mexetil 200 mg TID  - Appreciate EP recs.   - Underlying rhythm from OSH likely SVT, therefore not shocked,  device without atrial lead, set to VVI @ 30 bpm by EP, will require upgrade to BiV ICD.  (,  LBBB), not candidate for endocardial VT RFA due to layered thrombus.   - INR still supratherapeutic; suspect congestive hepatopathy.

## 2017-04-07 NOTE — PROGRESS NOTES
Electrophysiology Progress Note  Attending Physician: Fernanda Hopkins MD  Hospital Day: 5    Subjective:     Interval History: No events reported overnight. Telemetry with NSR, rates in the 50's-60's, B/P in the 110's to 130's SBP. INR now 5.5 this AM from 4.6 yesterday, LFT's still elevated, and leukocytosis still at 23, though afebrile, and blood and urine cultures negative from admission. C Diff pending.     On lasix gtt @ 20 mg/hr.    Medications:   Continuous Infusions:       Scheduled Meds:   amiodarone  400 mg Oral BID    aspirin  81 mg Oral Daily    furosemide  80 mg Intravenous TID    mexiletine  200 mg Oral Q8H     PRN Meds:acetaminophen, alprazolam, ceFAZolin 2 g/50mL Dextrose IVPB  Objective:     Vitals:  Temp:  [97.5 °F (36.4 °C)-98.7 °F (37.1 °C)]   Pulse:  [60-66]   Resp:  [14-50]   BP: (109-137)/(55-60)   SpO2:  [96 %-97 %]  I/O's:    Intake/Output Summary (Last 24 hours) at 04/07/17 1107  Last data filed at 04/07/17 1000   Gross per 24 hour   Intake              682 ml   Output             2410 ml   Net            -1728 ml        Constitutional: NAD, conversant  HEENT: Sclera anicteric, PERRLA, EOMI  Neck: 12-14 cm JVD, no carotid bruits  CV: Franco, no murmur, normal S1/S2  Pulm: Bibasilar crackles  GI: Abdomen soft, NTND, +BS  Extremities: 1+ LE edema, warm and well perfused  Skin: No ecchymosis, erythema, or ulcers    Labs:       Recent Labs  Lab 04/05/17  1338 04/06/17  0322 04/07/17  0259   * 134* 135*   K 3.7 3.9 3.8   CL 96 98 97   CO2 16* 21* 23   * 119* 112*   CREATININE 3.2* 3.1* 3.0*   * 103 111*   ANIONGAP 18* 15 15       Recent Labs  Lab 04/05/17  0232 04/06/17  0322 04/07/17  0259   * 149* 129*   * 400* 359*   ALKPHOS 98 106 101   BILITOT 0.6 0.6 0.6   ALBUMIN 2.7* 2.7* 2.7*        Recent Labs  Lab 04/05/17  0232 04/06/17  0322 04/07/17  0259   WBC 21.47* 24.80* 24.05*   HGB 13.1 13.0 12.6   HCT 35.9* 36.9* 37.5    222 226   GRAN 85.0*  89.1*  22.0* 90.0*  21.4*       Recent Labs  Lab 04/05/17  0232 04/06/17  0322 04/07/17  0259   INR 4.3* 4.6* 5.5*       Recent Labs  Lab 04/03/17  0057   BNP 2705*      Micro:   Blood Cultures  Lab Results   Component Value Date    LABBLOO No Growth to date 04/03/2017    LABBLOO No Growth to date 04/03/2017    LABBLOO No Growth to date 04/03/2017    LABBLOO No Growth to date 04/03/2017    LABBLOO No Growth to date 04/03/2017     Urine Cultures  Lab Results   Component Value Date    LABURIN No growth 04/03/2017       Imaging:   TTE (4/3/2017)  Technical Quality: This study was performed in conjunction with a 3ml intravenous injection of Optison contrast agent because of poor endocardial visualization.     General: A catheter is present in the right-sided cardiac chambers.     Aorta: The aortic root is normal in size, measuring 2.5 cm at sinotubular junction and 2.6 cm at Sinuses of Valsalva. The proximal ascending aorta is normal in size, measuring 2.6 cm across.     Left Atrium: The left atrial volume index is severely enlarged, measuring 80.38 cc/m2.     Left Ventricle: The left ventricle is normal in size, with an end-diastolic diameter of 5.0 cm, and an end-systolic diameter of 4.5 cm. LV wall thickness is normal, with the septum and the posterior wall each measuring 1.0 cm across. Relative wall thickness was normal at 0.40, and the LV mass index was increased at 128.4 g/m2 consistent with eccentric left ventricular hypertrophy. The inferior wall is akinetic. The apex is dyskinetic.   The following segments were akinetic: basal inferoseptum. Global left ventricular systolic function appears severely depressed. Visually estimated ejection fraction is 20-25%. The LV Doppler derived stroke volume equals 25.0 ccs. The E/e'(lat) is 25, consistent with significant diastolic dysfunction.     Right Atrium: The right atrium is normal in size, measuring 4.6 cm in length and 4.2 cm in width in the apical view.     Right  Ventricle: The right ventricle is normal in size measuring 3.4 cm at the base in the apical right ventricle-focused view. Global right ventricular systolic function was not well seen. The estimated PA systolic pressure is greater than 40 mmHg.     Aortic Valve:  The aortic valve is normal in structure. Additionally, there is mild aortic regurgitation.     Mitral Valve:  The mitral valve is normal in structure. There is mild to moderate mitral regurgitation. There is mitral annular calcification.     Tricuspid Valve:  The tricuspid valve is normal in structure. There is mild tricuspid regurgitation.     Pulmonary Valve:  The pulmonic valve is not well seen.     IVC: The IVC is not visualized.     Intracavitary: There is no evidence of pericardial effusion, intracavity mass, thrombi, or vegetation.     Other: There is a bilateral pleural effusion present.     CONCLUSIONS     1 - Eccentric LVH with severely depressed left ventricular systolic function (EF 20-25%). There is layer apical thrombus.    2 - Severe left atrial enlargement.     3 - Left ventricular diastolic dysfunction with increased LAP.     4 - Mild aortic regurgitation.     5 - Mild to moderate mitral regurgitation.     6 - Mild tricuspid regurgitation.     7 - The estimated PA systolic pressure is greater than 40 mmHg.     8 - Bilateral pleural effusion.    EF   Date Value Ref Range Status   2017 20 (A) 55 - 65      EK-lead rhythm strip shows sinus bradycardia with first degree AV block, LBBB morphology with QRS ~150ms     Telemetry: NSR with rates in the 60's overnight    Assessment:   77 y.o. woman with PMH CAD s/p CABGx2 , ICM s/p SC Biotronik AICD implanted on 3/23/2017, CKD 3-4, who was transferred from [place] overnight for management of ventricular management. EP now on consult.      On amiodarone  mg BID.  On mexiletine  mg TID.   On Coumadin, INR now supra-therapeutic at 5.5, currently holding.     Initial device  interrogation with 3 episodes of SVT occurring on 3/30/2017, all without any therapy. These rhythms seem consistent with SVT with abberancy, possibly AVNRT with PVC's vs. AT, although A sensing not as clear with single-lead AICD. Device was set at VVI at 60 bpm, later reporgrammed to VVI 30 yesterday.     12-lead rhythm strip shows sinus bradycardia with first degree AV block, LBBB morphology with QRS ~150ms.  This morning telemetry with NSR rates in the 60's.    Plan:   Wide complex tachycardia  - Unclear if this is SVT with abberancy, possibly AVNRT vs. AT, or a true VT as fusion complexes can be seen on surface ECG  - Awaiting cath films from OSH as unclear what is her coronary anatomy per faxed records  - Continue amiodarone  mg BID and mexiletine 200 mg TID  - Continue Coumadin for LV thrombus, INR currently therapeutic at 5.5, hold for now  - Continue to monitor on telemetry  - Will plan AICD upgrade to BiV once INR lower and liver/leukocytosis issues are fully worked up; will recommend removal of current RV lead and replacement with a DF-4 lead, a new quad-pole LV lead + RA lead (no Biotronik device can accept a DF-1 RV lead and a quad-pole LV lead)  - Would consider EPS +/- VT ablation, though with layered LV thrombus cannot be done at this time    Patient seen and examined this morning. Thank you for the opportunity to participate in the care of this interesting patient. Discussed with Dr. Roche, staff attestation to follow.    Signed:  Duke Mcgee MD  Cardiology Fellow - PGY4  Pager: [phone]  4/7/2017 11:11 AM

## 2017-04-07 NOTE — ASSESSMENT & PLAN NOTE
- SHANNAN on CKD in the setting of VT related hypoperfusion leading to ATN vs Cardiorenal syndrome  - Creatinine trendign down.   - Will transition to intermittent Lasix from drip.

## 2017-04-07 NOTE — PROGRESS NOTES
Progress Note  Nephrology    Admit Date: 4/3/2017   LOS: 4 days     SUBJECTIVE:     Follow-up For:  SHANNAN on CKD IV  Interval Hx:  NAEON.  Kidney function continues to improve, SCr down to 3.0 this morning with stable electrolytes.  UOP of 2.5L recorded yesterday, net negative 1.9L/24h.      Scheduled Meds:   amiodarone  400 mg Oral BID    aspirin  81 mg Oral Daily    furosemide  80 mg Intravenous TID    mexiletine  200 mg Oral Q8H     Continuous Infusions:   PRN Meds:acetaminophen, alprazolam, ceFAZolin 2 g/50mL Dextrose IVPB    Review of patient's allergies indicates:  No Known Allergies    Review of Systems  Constitutional: no chills, no fevers  HEENT: no issues   Resp: no shortness of breath, no cough but dyspnea on exertion   Cardiac: no chest pain, no palpitations  Abd: no nausea or vomiting. No abdominal pain  Neuro: no headache, no dizziness.   Psych: no depression, no agitation  Skin: no rashes, itching, lesions  Muscular: no joint pains, cramps at night in her legs    OBJECTIVE:     Vital Signs (Most Recent)  Temp: 97.6 °F (36.4 °C) (04/07/17 1200)  Pulse: 62 (04/07/17 1354)  Resp: 16 (04/07/17 1354)  BP: (!) 118/57 (04/07/17 1354)  SpO2: 98 % (04/07/17 1205)    Vital Signs Range (Last 24H):  Temp:  [97.5 °F (36.4 °C)-98.7 °F (37.1 °C)]   Pulse:  [60-74]   Resp:  [14-50]   BP: (109-137)/(54-60)   SpO2:  [96 %-98 %]     I & O (Last 24H):  Intake/Output Summary (Last 24 hours) at 04/07/17 1501  Last data filed at 04/07/17 1240   Gross per 24 hour   Intake              546 ml   Output             2161 ml   Net            -1615 ml     Physical Exam:     Gen: WDWN female in no apparent distress.  AOx4.  Eyes: Normal conjunctiva and lids, PERRLA  ENT: Normal hearing with no oropharyngeal lesions  Neck: No JVD  Chest: Bibasilar crackles.  Good chest expansion.  CV: Regular with a systolic murmur, S 3  Abd: Soft, nontender, no distension, positive bowel sounds  Ext: No cyanosis, clubbing or  edema    Laboratory:  CBC:   Recent Labs  Lab 04/07/17  0259   WBC 24.05*   RBC 4.41   HGB 12.6   HCT 37.5      MCV 85   MCH 28.6   MCHC 33.6     BMP:   Recent Labs  Lab 04/07/17  0259   *   *   K 3.8   CL 97   CO2 23   *   CREATININE 3.0*   CALCIUM 8.9   MG 2.0         ASSESSMENT/PLAN:     78 yo female with SHANNAN on CKD with severe ischemic cardiomyopathy and s/p VT with hypotension, NSTEMI and angiogram.      Plan:   SHANNAN on CKD IV  -Baseline SCr around 2.0.  -SHANNAN most likely multifactorial, ATN 2/2 hypotension along with RENA.  -diuretics decreased yesterday from continuous infusion to TID dosing.  -recommend decreasing lasix to 80 mg BID        GENOVEVA Navarro, TAYP-BC  Nephrology  Pager:  [phone]    I have reviewed and concur with the fellow's history, physical, assessment, and plan. I have personally interviewed and examined the patient at bedside.

## 2017-04-08 NOTE — NURSING
Assist from  BSC to bed due to back pain. AAOx4. Respirations even and unlabored. Denies dizziness, HA, or feeling bad at this time.  Tylenol given.

## 2017-04-08 NOTE — PROGRESS NOTES
[facility]  Cardiology  Progress Note    Patient Name: Maty Epps  MRN: [id number]  Admission Date: 4/3/2017  Hospital Length of Stay: 5 days  Code Status: Full Code   Attending Physician: Fernanda Hopkins MD   Primary Care Physician: Primary Doctor No  Expected Discharge Date: 4/11/2017  Principal Problem:<principal problem not specified>    Subjective:     Hospital Course:   4/7/2017 - US Abd. Cholelithiasis without -cyctitis.     No overnigth issues. No more VTs vs Ectopy.   Feeling better this AM.   Labs with trending down liver enzymes, creatinine and white count.       Interval History: As mentioned in the hospital course.     Review of Systems   Constitution: Negative.   HENT: Negative.    Cardiovascular: Negative.      Objective:     Vital Signs (Most Recent):  Temp: 97.9 °F (36.6 °C) (04/08/17 0701)  Pulse: 63 (04/08/17 0900)  Resp: 20 (04/08/17 0900)  BP: (!) 99/51 (04/08/17 0900)  SpO2: 99 % (04/08/17 0900) Vital Signs (24h Range):  Temp:  [97.6 °F (36.4 °C)-98.7 °F (37.1 °C)] 97.9 °F (36.6 °C)  Pulse:  [58-74] 63  Resp:  [11-27] 20  SpO2:  [95 %-100 %] 99 %  BP: ()/(51-64) 99/51     Weight: 55 kg (121 lb 4.1 oz)  Body mass index is 22.18 kg/(m^2).     SpO2: 99 %  O2 Device (Oxygen Therapy): room air      Intake/Output Summary (Last 24 hours) at 04/08/17 1036  Last data filed at 04/08/17 0900   Gross per 24 hour   Intake             1400 ml   Output             1876 ml   Net             -476 ml       Lines/Drains/Airways     Peripheral Intravenous Line                 Peripheral IV - Single Lumen 04/05/17 2320 Right Wrist 2 days                Physical Exam   Constitutional: She is oriented to person, place, and time. She appears well-developed and well-nourished.   HENT:   Head: Normocephalic and atraumatic.   Eyes: Conjunctivae and EOM are normal. Pupils are equal, round, and reactive to light.   Neck: Normal range of motion. Neck supple.   Cardiovascular: Normal rate and  regular rhythm.    Pulmonary/Chest: Effort normal and breath sounds normal.   Abdominal: Soft. Bowel sounds are normal.   Musculoskeletal: Normal range of motion. She exhibits no edema.   Neurological: She is alert and oriented to person, place, and time. She has normal reflexes.   Skin: Skin is warm and dry.   Psychiatric: She has a normal mood and affect.   Nursing note and vitals reviewed.      Significant Labs:   CMP   Recent Labs  Lab 04/07/17 0259 04/08/17 0218   * 132*   K 3.8 4.0   CL 97 95   CO2 23 23   * 111*   * 119*   CREATININE 3.0* 2.8*   CALCIUM 8.9 8.6*   PROT 6.2 5.9*   ALBUMIN 2.7* 2.5*   BILITOT 0.6 0.5   ALKPHOS 101 95   * 77*   * 265*   ANIONGAP 15 14   ESTGFRAFRICA 16.8* 18.2*   EGFRNONAA 14.5* 15.8*   , CBC   Recent Labs  Lab 04/07/17 0259 04/08/17 0218   WBC 24.05* 21.06*   HGB 12.6 11.0*   HCT 37.5 31.8*    214    and INR   Recent Labs  Lab 04/07/17 0259 04/08/17  0218   INR 5.5* 4.6*       Significant Imaging: X-Ray: CXR: X-Ray Chest 1 View (CXR):   Results for orders placed or performed during the hospital encounter of 04/03/17   X-Ray Chest 1 View    Narrative    Portable AP view of the chest was obtained.  Comparison -- none. A pacemaker is present on the left with single electrode to the right ventricle. Postoperative changes are present likely CABG. The heart is mildly enlarged. Mediastinum shows aortic atherosclerosis. The lungs are expanded with increased pulmonary vascularity and interstitial markings. Minimal air space consolidation is present in the bases. Small pleural effusions are present on both sides. Skeletal structures show sternal wires .    Impression     Status post CABG. Mild CHF.      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      Acute decompensated heart failure  - In the setting of ICMP; Euvolumic, trending down liver enzymes and creatinine,  - Continue low dose intermittent Lasix to maintain euvolumic state; electrolytes normal, monitor for need for repletion.   - Optimize medical therapy upon improvement and before discharge.     Ventricular tachycardia  No further episodes since admission  -  WCT at OSH with DC Shock from external device.   - EP following the case, appreciate rec's; continue Amiodarone 400 mg PO BID, Mexetil 200 mg TID  - INR still supratherapeutic; suspect congestive hepatopathy; once INR stable, will be consider for an upgrade of ICD.     S/P CABG (coronary artery bypass graft)  - To follow up on OSH C report.   - Continue ASA, BB and Statin on hold for now, for acute decompensation and elevated liver enzymes.       VTE Risk Mitigation         Ordered     Medium Risk of VTE  Once      04/03/17 0131          Hilario Puente MD  Cardiology  [facility]

## 2017-04-08 NOTE — NURSING
HTS notified of pt BP 68/38 manual and 70/38 automatic. Pt is awake and alert. Asymptomatic. New order for 250 NS bolus, given at this time.

## 2017-04-08 NOTE — PROGRESS NOTES
Progress Note  Nephrology    Admit Date: 4/3/2017   LOS: 5 days     SUBJECTIVE:     Follow-up For:  SHANNAN on CKD IV  Interval Hx:  NAEON.  Kidney function continues to improve but patient getting more azotemic this morning. Patient net - 391 ml since yesterday. C. Diff negative.     Scheduled Meds:   amiodarone  400 mg Oral BID    aspirin  81 mg Oral Daily    furosemide  40 mg Oral BID    mexiletine  200 mg Oral Q8H     Continuous Infusions:   PRN Meds:acetaminophen, alprazolam, ceFAZolin 2 g/50mL Dextrose IVPB    Review of patient's allergies indicates:  No Known Allergies    Review of Systems  Constitutional: no chills, no fevers  HEENT: no issues   Resp: no shortness of breath, no cough but dyspnea on exertion   Cardiac: no chest pain, no palpitations  Abd: no nausea or vomiting. No abdominal pain  Neuro: no headache, no dizziness.   Psych: no depression, no agitation  Skin: no rashes, itching, lesions  Muscular: no joint pains, cramps at night in her legs    OBJECTIVE:     Vital Signs (Most Recent)  Temp: 98.5 °F (36.9 °C) (04/08/17 0300)  Pulse: 62 (04/08/17 0600)  Resp: 16 (04/08/17 0600)  BP: (!) 118/56 (04/08/17 0600)  SpO2: 100 % (04/08/17 0600)    Vital Signs Range (Last 24H):  Temp:  [97.6 °F (36.4 °C)-98.7 °F (37.1 °C)]   Pulse:  [59-74]   Resp:  [11-33]   BP: (109-133)/(52-64)   SpO2:  [95 %-100 %]     I & O (Last 24H):    Intake/Output Summary (Last 24 hours) at 04/08/17 0720  Last data filed at 04/08/17 0500   Gross per 24 hour   Intake             1410 ml   Output             1801 ml   Net             -391 ml     Physical Exam:     Gen: WDWN female in no apparent distress.  AOx4.  Eyes: Normal conjunctiva and lids, PERRLA  ENT: Normal hearing with no oropharyngeal lesions  Neck: No JVD  Chest: Bibasilar crackles.  Good chest expansion.  CV: Regular with a systolic murmur, S 3  Abd: Soft, nontender, no distension, positive bowel sounds  Ext: No cyanosis, clubbing or edema    Laboratory:  CBC:      Recent Labs  Lab 04/08/17 0218   WBC 21.06*   RBC 3.74*   HGB 11.0*   HCT 31.8*      MCV 85   MCH 29.4   MCHC 34.6     BMP:     Recent Labs  Lab 04/08/17 0218   *   *   K 4.0   CL 95   CO2 23   *   CREATININE 2.8*   CALCIUM 8.6*   MG 2.0         ASSESSMENT/PLAN:     76 yo female with SHANNAN on CKD with severe ischemic cardiomyopathy and s/p VT with hypotension, NSTEMI and angiogram.      SHANNAN on CKD IV  -Baseline SCr around 2.0.  -SHANNAN most likely multifactorial, ATN 2/2 hypotension along with RENA.  - Renal function slowly improving but patient getting more azotemic  - Agreed with decreasing Lasix to 40 mg PO BID   - Will continue trending renal function and urine output  - Monitor strict I/Os, daily weights  - Will continue to monitor.    Reid Izaguirre   Nephrology fellow PGY- 5  [phone]

## 2017-04-08 NOTE — ASSESSMENT & PLAN NOTE
- In the setting of ICMP; Euvolumic, trending down liver enzymes and creatinine,  - Continue low dose intermittent Lasix to maintain euvolumic state; electrolytes normal, monitor for need for repletion.   - Optimize medical therapy upon improvement and before discharge.

## 2017-04-08 NOTE — PLAN OF CARE
Problem: Patient Care Overview  Goal: Plan of Care Review  Outcome: Ongoing (interventions implemented as appropriate)  POC reviewed with pt and family at 1400. Pt verbalized understanding. Questions and concerns addressed. Pt hypotensive throughout the day. HTS aware. Remains asymptomatic. Will continue to monitor. Pt progressing toward goals. See flowsheets for full assessment and VS info.

## 2017-04-08 NOTE — PROGRESS NOTES
Electrophysiology Progress Note  Attending Physician: Fernanda Hopkins MD  Hospital Day: 6    Subjective:     Interval History: No events reported overnight. Telemetry with NSR, rates in the 60's. INR now 4.6 this AM from 5.5 yesterday, LFT's still elevated but improving, and leukocytosis down to 21 from 23; afebrile, blood and urine cultures negative from admission, C Diff negative.    On lasix gtt @ 20 mg/hr.    Medications:   Continuous Infusions:       Scheduled Meds:   amiodarone  400 mg Oral BID    aspirin  81 mg Oral Daily    furosemide  40 mg Oral BID    mexiletine  200 mg Oral Q8H     PRN Meds:acetaminophen, alprazolam, ceFAZolin 2 g/50mL Dextrose IVPB  Objective:     Vitals:  Temp:  [97.6 °F (36.4 °C)-98.7 °F (37.1 °C)]   Pulse:  [58-74]   Resp:  [11-28]   BP: ()/(51-64)   SpO2:  [95 %-100 %]  I/O's:    Intake/Output Summary (Last 24 hours) at 04/08/17 0948  Last data filed at 04/08/17 0900   Gross per 24 hour   Intake             1400 ml   Output             2151 ml   Net             -751 ml        Constitutional: NAD, conversant  HEENT: Sclera anicteric, PERRLA, EOMI  Neck: 12-14 cm JVD, no carotid bruits  CV: Franco, no murmur, normal S1/S2  Pulm: Bibasilar crackles  GI: Abdomen soft, NTND, +BS  Extremities: 1+ LE edema, warm and well perfused  Skin: No ecchymosis, erythema, or ulcers    Labs:       Recent Labs  Lab 04/06/17  0322 04/07/17  0259 04/08/17  0218   * 135* 132*   K 3.9 3.8 4.0   CL 98 97 95   CO2 21* 23 23   * 112* 119*   CREATININE 3.1* 3.0* 2.8*    111* 111*   ANIONGAP 15 15 14       Recent Labs  Lab 04/06/17  0322 04/07/17  0259 04/08/17  0218   * 129* 77*   * 359* 265*   ALKPHOS 106 101 95   BILITOT 0.6 0.6 0.5   ALBUMIN 2.7* 2.7* 2.5*        Recent Labs  Lab 04/06/17  0322 04/07/17  0259 04/08/17  0218   WBC 24.80* 24.05* 21.06*   HGB 13.0 12.6 11.0*   HCT 36.9* 37.5 31.8*    226 214   GRAN 89.1*  22.0* 90.0*  21.4* 82.0*        Recent Labs  Lab 04/06/17  0322 04/07/17  0259 04/08/17  0218   INR 4.6* 5.5* 4.6*       Recent Labs  Lab 04/03/17  0057   BNP 2705*      Micro:   Blood Cultures  Lab Results   Component Value Date    LABBLOO No growth after 5 days. 04/03/2017    LABBLOO No growth after 5 days. 04/03/2017     Urine Cultures  Lab Results   Component Value Date    LABURIN  04/06/2017     MARY LOU ALBICANS  50,000 - 99,999 cfu/ml  Treatment of asymptomatic candiduria is not recommended (except for   specific populations). Candida isolated in the urine typically   represents colonization. If an indwelling urinary catheter is present  it should be removed or replaced.      LABURIN No growth 04/03/2017       Imaging:   TTE (4/3/2017)  Technical Quality: This study was performed in conjunction with a 3ml intravenous injection of Optison contrast agent because of poor endocardial visualization.     General: A catheter is present in the right-sided cardiac chambers.     Aorta: The aortic root is normal in size, measuring 2.5 cm at sinotubular junction and 2.6 cm at Sinuses of Valsalva. The proximal ascending aorta is normal in size, measuring 2.6 cm across.     Left Atrium: The left atrial volume index is severely enlarged, measuring 80.38 cc/m2.     Left Ventricle: The left ventricle is normal in size, with an end-diastolic diameter of 5.0 cm, and an end-systolic diameter of 4.5 cm. LV wall thickness is normal, with the septum and the posterior wall each measuring 1.0 cm across. Relative wall thickness was normal at 0.40, and the LV mass index was increased at 128.4 g/m2 consistent with eccentric left ventricular hypertrophy. The inferior wall is akinetic. The apex is dyskinetic.   The following segments were akinetic: basal inferoseptum. Global left ventricular systolic function appears severely depressed. Visually estimated ejection fraction is 20-25%. The LV Doppler derived stroke volume equals 25.0 ccs. The E/e'(lat) is 25,  consistent with significant diastolic dysfunction.     Right Atrium: The right atrium is normal in size, measuring 4.6 cm in length and 4.2 cm in width in the apical view.     Right Ventricle: The right ventricle is normal in size measuring 3.4 cm at the base in the apical right ventricle-focused view. Global right ventricular systolic function was not well seen. The estimated PA systolic pressure is greater than 40 mmHg.     Aortic Valve:  The aortic valve is normal in structure. Additionally, there is mild aortic regurgitation.     Mitral Valve:  The mitral valve is normal in structure. There is mild to moderate mitral regurgitation. There is mitral annular calcification.     Tricuspid Valve:  The tricuspid valve is normal in structure. There is mild tricuspid regurgitation.     Pulmonary Valve:  The pulmonic valve is not well seen.     IVC: The IVC is not visualized.     Intracavitary: There is no evidence of pericardial effusion, intracavity mass, thrombi, or vegetation.     Other: There is a bilateral pleural effusion present.     CONCLUSIONS     1 - Eccentric LVH with severely depressed left ventricular systolic function (EF 20-25%). There is layer apical thrombus.    2 - Severe left atrial enlargement.     3 - Left ventricular diastolic dysfunction with increased LAP.     4 - Mild aortic regurgitation.     5 - Mild to moderate mitral regurgitation.     6 - Mild tricuspid regurgitation.     7 - The estimated PA systolic pressure is greater than 40 mmHg.     8 - Bilateral pleural effusion.    EF   Date Value Ref Range Status   2017 20 (A) 55 - 65      EK-lead rhythm strip shows sinus bradycardia with first degree AV block, LBBB morphology with QRS ~150ms     Telemetry: NSR with rates in the 60's overnight    Assessment:   77 y.o. woman with PMH CAD s/p CABGx2 , ICM s/p SC Biotronik AICD implanted on 3/23/2017, CKD 3-4, who was transferred from [place] overnight for management of ventricular  management. EP now on consult.      On amiodarone  mg BID.  On mexiletine  mg TID.   On Coumadin, INR now supra-therapeutic at 4.6, currently holding.     Initial device interrogation with 3 episodes of SVT occurring on 3/30/2017, all without any therapy. These rhythms seem consistent with SVT with abberancy, possibly AVNRT with PVC's vs. AT, although A sensing not as clear with single-lead AICD. Device was set at VVI at 60 bpm, later reporgrammed to VVI 30 yesterday.     12-lead rhythm strip shows sinus bradycardia with first degree AV block, LBBB morphology with QRS ~150ms.  This morning telemetry with NSR rates in the 60's.    Plan:   Wide complex tachycardia  - Unclear if this is SVT with abberancy, possibly AVNRT vs. AT, or a true VT as fusion complexes can be seen on surface ECG  - Awaiting cath films from OSH as unclear what is her coronary anatomy per faxed records  - Continue amiodarone  mg BID and mexiletine 200 mg TID  - Continue Coumadin for LV thrombus, INR currently therapeutic at 4.6, hold for now  - Continue to monitor on telemetry  - Will plan AICD upgrade to BiV once INR lower and liver/leukocytosis issues are fully worked up; will recommend removal of current RV lead and replacement with a DF-4 lead, a new quad-pole LV lead + RA lead (no Biotronik device can accept a DF-1 RV lead and a quad-pole LV lead)  - Would consider EPS +/- VT ablation, though with layered LV thrombus cannot be done at this time    Patient seen and examined this morning. Thank you for the opportunity to participate in the care of this interesting patient. Discussed with Dr. Roche, staff attestation to follow.    Signed:  Duke Mcgee MD  Cardiology Fellow - PGY4  Pager: [phone]  4/8/2017 9:51 AM

## 2017-04-08 NOTE — NURSING
[facility] notified of pt BP. No new orders at this time. Instructed to call if SBP sustains under 90

## 2017-04-08 NOTE — SUBJECTIVE & OBJECTIVE
Interval History: As mentioned in the hospital course.     Review of Systems   Constitution: Negative.   HENT: Negative.    Cardiovascular: Negative.      Objective:     Vital Signs (Most Recent):  Temp: 97.9 °F (36.6 °C) (04/08/17 0701)  Pulse: 63 (04/08/17 0900)  Resp: 20 (04/08/17 0900)  BP: (!) 99/51 (04/08/17 0900)  SpO2: 99 % (04/08/17 0900) Vital Signs (24h Range):  Temp:  [97.6 °F (36.4 °C)-98.7 °F (37.1 °C)] 97.9 °F (36.6 °C)  Pulse:  [58-74] 63  Resp:  [11-27] 20  SpO2:  [95 %-100 %] 99 %  BP: ()/(51-64) 99/51     Weight: 55 kg (121 lb 4.1 oz)  Body mass index is 22.18 kg/(m^2).     SpO2: 99 %  O2 Device (Oxygen Therapy): room air      Intake/Output Summary (Last 24 hours) at 04/08/17 1036  Last data filed at 04/08/17 0900   Gross per 24 hour   Intake             1400 ml   Output             1876 ml   Net             -476 ml       Lines/Drains/Airways     Peripheral Intravenous Line                 Peripheral IV - Single Lumen 04/05/17 2320 Right Wrist 2 days                Physical Exam   Constitutional: She is oriented to person, place, and time. She appears well-developed and well-nourished.   HENT:   Head: Normocephalic and atraumatic.   Eyes: Conjunctivae and EOM are normal. Pupils are equal, round, and reactive to light.   Neck: Normal range of motion. Neck supple.   Cardiovascular: Normal rate and regular rhythm.    Pulmonary/Chest: Effort normal and breath sounds normal.   Abdominal: Soft. Bowel sounds are normal.   Musculoskeletal: Normal range of motion. She exhibits no edema.   Neurological: She is alert and oriented to person, place, and time. She has normal reflexes.   Skin: Skin is warm and dry.   Psychiatric: She has a normal mood and affect.   Nursing note and vitals reviewed.      Significant Labs:   CMP   Recent Labs  Lab 04/07/17  0259 04/08/17  0218   * 132*   K 3.8 4.0   CL 97 95   CO2 23 23   * 111*   * 119*   CREATININE 3.0* 2.8*   CALCIUM 8.9 8.6*   PROT  6.2 5.9*   ALBUMIN 2.7* 2.5*   BILITOT 0.6 0.5   ALKPHOS 101 95   * 77*   * 265*   ANIONGAP 15 14   ESTGFRAFRICA 16.8* 18.2*   EGFRNONAA 14.5* 15.8*   , CBC   Recent Labs  Lab 04/07/17  0259 04/08/17  0218   WBC 24.05* 21.06*   HGB 12.6 11.0*   HCT 37.5 31.8*    214    and INR   Recent Labs  Lab 04/07/17  0259 04/08/17  0218   INR 5.5* 4.6*       Significant Imaging: X-Ray: CXR: X-Ray Chest 1 View (CXR):   Results for orders placed or performed during the hospital encounter of 04/03/17   X-Ray Chest 1 View    Narrative    Portable AP view of the chest was obtained.  Comparison -- none. A pacemaker is present on the left with single electrode to the right ventricle. Postoperative changes are present likely CABG. The heart is mildly enlarged. Mediastinum shows aortic atherosclerosis. The lungs are expanded with increased pulmonary vascularity and interstitial markings. Minimal air space consolidation is present in the bases. Small pleural effusions are present on both sides. Skeletal structures show sternal wires .    Impression     Status post CABG. Mild CHF.      Electronically signed by: ROBIN PALACIOS MD  Date:     04/03/17  Time:    08:12

## 2017-04-08 NOTE — ASSESSMENT & PLAN NOTE
- To follow up on [facility] report.   - Continue ASA, BB and Statin on hold for now, for acute decompensation and elevated liver enzymes.

## 2017-04-08 NOTE — ASSESSMENT & PLAN NOTE
No further episodes since admission  -  WCT at OSH with DC Shock from external device.   - EP following the case, appreciate rec's; continue Amiodarone 400 mg PO BID, Mexetil 200 mg TID  - INR still supratherapeutic; suspect congestive hepatopathy; once INR stable, will be consider for an upgrade of ICD.

## 2017-04-09 NOTE — PROGRESS NOTES
Pt was coded, CPR was performed and later Pt was intubated with the documented settings. Bhavani Nieto MD was at bedside, anestheologist who performed the intubation was at bedsides and nurses were also at bedside. Will continue to monitor.

## 2017-04-09 NOTE — PROGRESS NOTES
AICD interrogation    Device: Biotronik  Mode: VVI 30   3 episodes of SVT on 03/30 detected, no therapies provided    Turned AICD therapies off. VVI lower rate increased to 70 so that we can turn off transthoracic pacing    Bhavani Nieto MD  Cardiology PGY4

## 2017-04-09 NOTE — NURSING
"At around 0245, I entered the patient's room to check on her. She appeared pale and cool. She said "take everything off of me, I don't want to do this anymore." I assured her that she was okay and went up on the norepinephrine rate. A few minutes later, the patient had a long run of V tach. Patient looked drained and complained of SOB and weakness. Two more nurses entered the room to check on the patient. Nasal cannula applied to patient. One nurse called for the code cart. During that time, patient stared off into space and went unresponsive. As pulse weakened too faint to feel, we initiated CPR. MD, respiratory therapist and anesthiologist came to bedside. Intubation, femoral central line insertion and pressors all initiated as well. See code flowsheet for full details.  "

## 2017-04-09 NOTE — DISCHARGE SUMMARY
"[facility]  Cardiology  Discharge Summary      Patient Name: Maty Epps  MRN: [id number]  Admission Date: 4/3/2017  Hospital Length of Stay: 6 days  Discharge Date and Time:  04/09/2017 3:32 PM  Attending Physician: Jennifer att. providers found  Discharging Provider: Hilario Puente MD  Primary Care Physician: Primary Doctor No               DEATH SUMMARY       HPI: Ms. Epps was a 77 YOCW with PMH of MI(remote), HTN, CABG x 2(1999), CKD 3-4, s/p ICD 3/23/17, heavy tobacco use who is transferred to [facility] for ventricular tachycardia management . Her treatment course started after a post device clinic visit on 3/30 when patient developed VT in the clinic and her device was unable to stop it so she was shocked. She was admitted to the hospital thereafter and placed in the ICU for monitoring. After transfer to the floor 3/31, that evening she developed VT again and returned to the ICU, receiving amiodarone and lidocaine. Per family patient had a LHC on 3/31 which was "normal" and an echo showing a blood clot in the "bottom" of the heart.      She remained in the ICU and was diuresed as she had volume overload with congestive hepatopathy and renal failure. After both improved with diuresis she was transitioned to intermittent diuretics. She was also seen by EP cadiologists who wanted to upgrade her device therapy to CRT-D upon improvement in INR which was supratherpeutic.       On 4/8/2017 she was found to have hypotension and elevated white count. Concern for sepsis lead to infuse around 500 cc of IV Fluid. However, her BP continued to plummet down and later on was started on Nor-Epinephrine which did not help her.     She was found to have decreasing heart rate and hence was started on Epinephrine drip.This also did not help her and coded early morning. She was resuscitated for about 15 minutes. During this time the family member talked to the [facility] cardiology fellow to stop resuscitative measures as she " wanted to be DNR/DNI should such situation arise.     She did not survive and passed away after the ACLS protocol was discontinued.     TIME OF DEATH   7: 13 AM  CAUSE OF DEATH : Cardiogenic shock.         Indwelling Lines/Drains at time of discharge:  Lines/Drains/Airways          No matching active lines, drains, or airways            Consults:   Consults         Status Ordering Provider     Inpatient consult to Electrophysiology  Once     Provider:  (Not yet assigned)    Completed MARIZA SINGH     Inpatient consult to Nephrology  Once     Provider:  (Not yet assigned)    Completed SIOBHAN MARCUM     Nutrition Services Referral  Once     Provider:  (Not yet assigned)    Completed JACQUES SCHNEIDER          Significant Diagnostic Studies:   Pending Diagnostic Studies:     Procedure Component Value Units Date/Time    CBC auto differential [[id number]] Collected:  04/09/17 0300    Order Status:  Sent Lab Status:  No result     Specimen:  Blood from Blood     Comprehensive metabolic panel - if not done in ED [[id number]] Collected:  04/09/17 0300    Order Status:  Sent Lab Status:  No result     Specimen:  Blood from Blood     Magnesium - if not done in ED [[id number]] Collected:  04/09/17 0300    Order Status:  Sent Lab Status:  No result     Specimen:  Blood from Blood     Phosphorus - if not done in ED [[id number]] Collected:  04/09/17 0300    Order Status:  Sent Lab Status:  No result     Specimen:  Blood from Blood     Protime-INR [[id number]] Collected:  04/09/17 0300    Order Status:  Sent Lab Status:  No result     Specimen:  Blood from Blood     VANCOMYCIN, TROUGH [[id number]] Collected:  04/09/17 0300    Order Status:  Sent Lab Status:  No result     Specimen:  Blood from Blood           Final Active Diagnoses:    Diagnosis Date Noted POA    Acute decompensated heart failure [I50.9] 04/07/2017 Yes    Wide-complex tachycardia [I47.2] 04/06/2017 Yes    LBBB (left bundle branch block) [I44.7] 04/06/2017 Yes     SHANNAN (acute kidney injury) [N17.9] 2017 Yes    ATN (acute tubular necrosis) [N17.0] 2017 Yes    Contrast dye induced nephropathy [T50.8X1A, N14.1] 2017 No    Hyponatremia [E87.1] 2017 Yes    S/P CABG (coronary artery bypass graft) [Z95.1] 2017 Not Applicable    Chronic systolic heart failure [I50.22] 2017 Yes    S/P ICD (internal cardiac defibrillator) procedure [Z95.810] 2017 Yes    Cardiac resynchronization therapy defibrillator (CRT-D) in place [Z95.810] 2017 Yes    Benign hypertensive heart and CKD, stage 3 (GFR 30-59), w CHF [I13.0, N18.3, I50.9] 2017 Yes    LV (left ventricular) mural thrombus without MI [I51.3] 2017 Yes    Ischemic cardiomyopathy [I25.5] 2017 Yes    Ventricular tachycardia [I47.2] 2017 Yes      Problems Resolved During this Admission:    Diagnosis Date Noted Date Resolved POA       Discharged Condition:     Follow Up:    Patient Instructions:   No discharge procedures on file.  Medications:  None (patient  at medical facility)    Time spent on the discharge of patient: 0 minutes    Hilario Puente MD  Cardiology  [facility]

## 2017-04-09 NOTE — PROGRESS NOTES
Patient PEA upon arrival to shift, Dr. Nieto aware and at bedside,  at bedside for family comfort, body prepared and dispositioned to DEISY castro declined based on age.

## 2017-04-09 NOTE — PROGRESS NOTES
Blood pressure still in the 60s. Has received roughly 500cc in boluses over the course of the day. Last bolus was not effective in keeping her BP up. Will start levo and antibiotics.    Discussed with Dr. Dejuan Nieto MD  Cardiology PGY4

## 2017-04-09 NOTE — PROGRESS NOTES
Death Note    Was notified by nurses that patient had passed. Arrived to see patient alone with no family at bedside. Pateint had fixed dilated pupils. No cardiac or respiratory breath sounds heard on auscultation. No pulse was palpable. Patient was pronounced dead at 7:13am    Cause of Death: cardiogenic shock  Time of Death: 7:13 am

## 2017-04-09 NOTE — PROGRESS NOTES
Was called by nurse as patient appeared pale and did not feel well. The levo was titrated up to 0.7. On exam, she was cold and edematous peripherally. While talking to her, she began to develop a blank stare and began agonally breathing. Patient became progressively more bradycardic and developed PEA/asystolic arrest. Chest compressions initiated and patient was intubated. See code flowsheet for documentation. Code lased for roughly 15 minutes before ROSC was achieved. CVC was placed via R CFA with good blood return. A-line attempts were made but was not successful. These procedures were not done sterilely and consent was not obtained as they were performed emergently. Patient was maxed out on 3 pressors and interventional cardiology was called for IABP as she was in cardiogenic shock. Attempted to interrogate her device with multiple interrogaters of different brands, but it was not successful. Spoke with son (Romaine Epps), who stated that she had a DNR/DNI that was in place from prior to the hospital stay and that this has progressed further than what she would have ever want. He confirmed with his cousin (Sridhar Epps) that this was the case and they both want her to pass peacefully and naturally. Called interventional back to cancel IABP insertion. Her family all live 1.5 hours away and she likely would not make it that long. Will begin comfort care with withdrawal measures. Will initiate morphine gtt, ativan prn, and atropine prn for secretions. Will terminally extubate with DNR/DNI in place. Will then turn off drips and the transthoracic pacing afterwards.    Discussed with Dr. Zaidi and Dr. Guallpa.    Bhavani Nieto MD  Cardiology PGY4

## 2017-04-09 NOTE — ANESTHESIA PROCEDURE NOTES
Intubation    Diagnosis: cardiopulmonary failure  Doctor requesting consult: cardiac ICU  Patient location during procedure: ICU  Procedure start time: 4/9/2017 3:01 AM  Timeout: 4/9/2017 3:00 AM  Procedure end time: 4/9/2017 3:10 AM  Staffing  Resident/CRNA: BALDEV GUTIÉRREZ  Performed by: resident/CRNA   Preanesthetic Checklist  Completed: patient identified, site marked, surgical consent, pre-op evaluation, timeout performed, IV checked, risks and benefits discussed, monitors and equipment checked and anesthesia consent given  Intubation  Indication: respiratory failure mask ventilation: easy mask.  Intubation: n/a, laryngoscopy direct, Keith 2.  Endotracheal Tube: oral, 8.0 mm ID, cuffed (inflated to minimal occlusive pressure)  Attempts: 1, Grade I - full view of cords  Complicating Factors: none  Tube secured at 22 cm at the lips.  Findings post-intubation: bilateral breath sounds, atraumatic / condition of teeth unchanged, positive ETCO2  Position Confirmation: auscultation

## 2017-04-10 LAB
ALLENS TEST: ABNORMAL
DELSYS: ABNORMAL
ERYTHROCYTE [SEDIMENTATION RATE] IN BLOOD BY WESTERGREN METHOD: 14 MM/H
FIO2: 100
HCO3 UR-SCNC: 13.1 MMOL/L (ref 24–28)
HCT VFR BLD CALC: <15 %PCV (ref 36–54)
MODE: ABNORMAL
PCO2 BLDA: 43.6 MMHG (ref 35–45)
PEEP: 5
PH SMN: 7.08 [PH] (ref 7.35–7.45)
PO2 BLDA: 22 MMHG (ref 40–60)
POC BE: -17 MMOL/L
POC IONIZED CALCIUM: 0.97 MMOL/L (ref 1.06–1.42)
POC SATURATED O2: 23 % (ref 95–100)
POC TCO2: 14 MMOL/L (ref 24–29)
POTASSIUM BLD-SCNC: 6.5 MMOL/L (ref 3.5–5.1)
SAMPLE: ABNORMAL
SITE: ABNORMAL
SODIUM BLD-SCNC: 128 MMOL/L (ref 136–145)
VT: 450

## 2017-04-10 NOTE — PHYSICIAN QUERY
"PT Name: Maty Epps  MR #: [id number]     Physician Query Form - Documentation Clarification      CDS/: Kristi Holland RN, CDS               Contact information: [email or web address]    This form is a permanent document in the medical record.     Query Date: April 10, 2017    By submitting this query, we are merely seeking further clarification of documentation. Please utilize your independent clinical judgment when addressing the question(s) below.    The Medical record reflects the following:    Supporting Clinical Findings Location in Medical Record     "CAD s/p CABG and remote MI, s/p ICD 3/23/17   - Per patient and family LHC at OSH was "normal"    "post device clinic visit on 3/30 when patient developed VT in the clinic and her device was unable to stop it so she was shocked. She was admitted to the hospital thereafter and placed in the ICU for monitoring. After transfer to the floor 3/31, that evening she developed VT again and returned to the ICU, receiving amiodarone and lidocaine. Per family patient had a LHC on 3/31 which was "normal" and an echo showing a blood clot in the "bottom" of the heart."     H&P      H&P     "Recent NSTEMI, with incomplete info re: cath results"  "had a outpatient device check on 3/30/2017, at which time she was noted to be tachycardic to the 160's. Device was interrogated, and concern for VT, so attempt with device DF was made, however device would not provide shock as rhythm noted to be SVT. External DF at 200J was done which converted patient to a NSR in the 50's She was promptly transferred to [facility] and admitted to the ICU. She was also found to have an NSTEMI with a troponin of 20, and per documentation was taken for LHC where no intervention was performed. She was subsequently started on a lidocaine gtt and transitioned to PO amiodarone, and [place] [facility] [place]. She also had a reported LV thrombus seen on OSH " "TTE."       EP C/S 4/3                                                                            Doctor, Please specify diagnosis or diagnoses associated with above clinical findings.      Please specify the recent NSTEMI/remote MI diagnosis:        Provider Use Only    [X ] recent NSTEMI/remote MI occurred > 28 days ago    [  ] recent NSTEMI/remote MI occurred < 28 days ago    [  ] remote MI >28 days ago, as well as a recent NSTEMI < 28 days ago    [  ] Other (specify): ______________________________-    [  ] Clinically undetermined                                                                                                                         "

## 2017-04-10 NOTE — PT/OT/SLP DISCHARGE
Physical Therapy Discharge Summary    Maty Epps  MRN: [id number]   <principal problem not specified>   Patient Discharged from acute Physical Therapy on 17.  Please refer to prior PT noted date on 17 for functional status.     Assessment:   Patient was discharge unexpectedly.  Information required to complete and accurate discharge summary is unknown.  Refer to therapy initial evaluation and last progress note for initial and most recent functional status and goal achievement.  Recommendations made may be found in medical record. Patient is no longer making progress. Patient has not met goals.  GOALS:   Physical Therapy Goals        Problem: Physical Therapy Goal    Goal Priority Disciplines Outcome Goal Variances Interventions   Physical Therapy Goal     PT/OT, PT Ongoing (interventions implemented as appropriate)     Description:  Goals to be met by:      Patient will increase functional independence with mobility by performin. Supine to sit with Stand-by Assistance  2. Sit to supine with Stand-by Assistance  3. Sit to stand transfer with Stand-by Assistance  4. Gait  x 200 feet with SBA using Rolling Walker.               Reasons for Discontinuation of Therapy Services  Patient is unable to continue work toward goals because of medical or psychosocial complications.      Plan:  Patient Discharged to: Pt      Kerrie Killian, PT  4/10/2017  [phone]  .

## 2017-04-10 NOTE — PHYSICIAN QUERY
"PT Name: Maty Epps  MR #: [id number]     Physician Query Form - Diagnosis Clarification      CDS/: Kristi Holland RN, CDS               Contact information: [email or web address]    This form is a permanent document in the medical record.     Query Date: April 10, 2017    By submitting this query, we are merely seeking further clarification of documentation.  Please utilize your independent clinical judgment when addressing the question(s) below.     The medical record contains the following:      Findings Supporting Clinical Information Location in Medical Record     Sepsis   "Leukocytosis   - etiology unclear, patient is normotensive and afebrile   - may be acute phase from VT and shock vs infection   - urine and blood cultures sent, CXR pending   - will not initiate ABX at this time"    4/3 Bld Cx: NGTD   4/3 Urine Cx:  NGTD   4/6 Urine Cx: + Candida Albicans    "Patient was maxed out on 3 pressors and interventional cardiology was called for IABP as she was in cardiogenic shock."    "On 4/8/2017 she was found to have hypotension and elevated white count. Concern for sepsis lead to infuse around 500 cc of IV Fluid. However, her BP continued to plummet down and later on was started on Nor-Epinephrine which did not help her. "    "CAUSE OF DEATH :   Cardiogenic shock."   H&P            Lab results        Cards  4/9 @535g      Discharge Summary          Discharge Summary     Please clarify if the ___concern for sepsis_____ diagnosis has been:    [  ] Ruled In, POA  [  ] Ruled in, not POA  [  ] Ruled Out  [ X ] Clinically undetermined  [  ] Other/Clarification of findings (please specify)_______________________________    Please document in your progress notes daily for the duration of treatment, until resolved, and include in your discharge summary.                                                                                "

## 2017-04-11 NOTE — PT/OT/SLP DISCHARGE
Occupational Therapy Discharge Summary    Maty Epps  MRN: [id number]   <principal problem not specified>   Patient Discharged from acute Occupational Therapy on 17.       Assessment:   Patient was discharge unexpectedly.  Information required to complete and accurate discharge summary is unknown.  Refer to therapy initial evaluation and last progress note for initial and most recent functional status and goal achievement.  Recommendations made may be found in medical record.  GOALS:   Occupational Therapy Goals        Problem: Occupational Therapy Goal    Goal Priority Disciplines Outcome Interventions   Occupational Therapy Goal     OT, PT/OT Ongoing (interventions implemented as appropriate)    Description:  Goals to be met by: 17     Patient will increase functional independence with ADLs by performing:    UE Dressing with Supervision.  LE Dressing with Supervision.  Grooming while standing at sink with Supervision.  Toileting from toilet with Supervision for hygiene and clothing management.   Toilet transfer to toilet with Supervision.              Reasons for Discontinuation of Therapy Services  Patient is unable to continue work toward goals because of medical or psychosocial complications.      Plan:  Patient Discharged to: pt .

## 2017-04-26 NOTE — PHYSICIAN QUERY
"PT Name: Maty Epps  MR #: [id number]      Physician Query Form - Documentation Clarification       CDS/Code: Geri Espinoza RN, CDS      Contact information: Ext. [phone]   This form is a permanent document in the medical record.      Query Date: April 10, 2017     By submitting this query, we are merely seeking further clarification of documentation. Please utilize your independent clinical judgment when addressing the question(s) below.     The Medical record reflects the following:     Supporting Clinical Findings Location in Medical Record      "CAD s/p CABG and remote MI, s/p ICD 3/23/17   - Per patient and family LHC at OSH was "normal"     "post device clinic visit on 3/30 when patient developed VT in the clinic and her device was unable to stop it so she was shocked. She was admitted to the hospital thereafter and placed in the ICU for monitoring. After transfer to the floor 3/31, that evening she developed VT again and returned to the ICU, receiving amiodarone and lidocaine. Per family patient had a LHC on 3/31 which was "normal" and an echo showing a blood clot in the "bottom" of the heart."    H&P        H&P      "Recent NSTEMI, with incomplete info re: cath results"  "had a outpatient device check on 3/30/2017, at which time she was noted to be tachycardic to the 160's. Device was interrogated, and concern for VT, so attempt with device DF was made, however device would not provide shock as rhythm noted to be SVT. External DF at 200J was done which converted patient to a NSR in the 50's She was promptly transferred to [facility] and admitted to the ICU. She was also found to have an NSTEMI with a troponin of 20, and per documentation was taken for LHC where no intervention was performed. She was subsequently started on a lidocaine gtt and transitioned to PO amiodarone, and transferred to [facility] [place] for possible VT &ablation. She also had a reported LV thrombus seen on OSH TTE."       EP C/S 4/3 "          Doctor, Please specify diagnosis or diagnoses associated with above clinical findings.        Please specify the recent NSTEMI/remote MI diagnosis:           Provider Use Only      [ X ] recent NSTEMI/remote MI occurred < 28 days ago    [  ] recent NSTEMI/remote MI occurred > 28 days ago     [  ] remote MI >28 days ago, as well as a recent NSTEMI < 28 days ago     [  ] Other (specify): ______________________________-     [  ] Clinically undetermined